# Patient Record
Sex: FEMALE | Race: WHITE | NOT HISPANIC OR LATINO | Employment: OTHER | URBAN - METROPOLITAN AREA
[De-identification: names, ages, dates, MRNs, and addresses within clinical notes are randomized per-mention and may not be internally consistent; named-entity substitution may affect disease eponyms.]

---

## 2017-08-28 ENCOUNTER — ALLSCRIPTS OFFICE VISIT (OUTPATIENT)
Dept: OTHER | Facility: OTHER | Age: 82
End: 2017-08-28

## 2017-09-28 ENCOUNTER — ALLSCRIPTS OFFICE VISIT (OUTPATIENT)
Dept: OTHER | Facility: OTHER | Age: 82
End: 2017-09-28

## 2017-09-28 ENCOUNTER — GENERIC CONVERSION - ENCOUNTER (OUTPATIENT)
Dept: OTHER | Facility: OTHER | Age: 82
End: 2017-09-28

## 2018-06-02 ENCOUNTER — APPOINTMENT (EMERGENCY)
Dept: RADIOLOGY | Facility: HOSPITAL | Age: 83
End: 2018-06-02
Payer: MEDICARE

## 2018-06-02 ENCOUNTER — APPOINTMENT (EMERGENCY)
Dept: CT IMAGING | Facility: HOSPITAL | Age: 83
End: 2018-06-02
Payer: MEDICARE

## 2018-06-02 ENCOUNTER — HOSPITAL ENCOUNTER (OUTPATIENT)
Facility: HOSPITAL | Age: 83
Setting detail: OBSERVATION
Discharge: HOME | End: 2018-06-03
Attending: EMERGENCY MEDICINE | Admitting: INTERNAL MEDICINE
Payer: MEDICARE
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
## 2018-06-02 DIAGNOSIS — R53.1 WEAKNESS: Primary | ICD-10-CM

## 2018-06-02 PROBLEM — I10 HYPERTENSION: Status: ACTIVE | Noted: 2018-06-02

## 2018-06-02 PROBLEM — F41.9 ANXIETY: Status: ACTIVE | Noted: 2018-06-02

## 2018-06-02 PROBLEM — E78.5 HYPERLIPIDEMIA: Status: ACTIVE | Noted: 2018-06-02

## 2018-06-02 LAB
ALBUMIN SERPL BCP-MCNC: 3.6 G/DL (ref 3.5–5)
ALP SERPL-CCNC: 59 U/L (ref 46–116)
ALT SERPL W P-5'-P-CCNC: 29 U/L (ref 12–78)
ANION GAP SERPL CALCULATED.3IONS-SCNC: 6 MMOL/L (ref 4–13)
APTT PPP: 25 SECONDS (ref 24–36)
AST SERPL W P-5'-P-CCNC: 17 U/L (ref 5–45)
BACTERIA UR QL AUTO: ABNORMAL /HPF
BASOPHILS # BLD AUTO: 0.04 THOUSANDS/ΜL (ref 0–0.1)
BASOPHILS NFR BLD AUTO: 1 % (ref 0–1)
BILIRUB SERPL-MCNC: 0.4 MG/DL (ref 0.2–1)
BILIRUB UR QL STRIP: NEGATIVE
BUN SERPL-MCNC: 15 MG/DL (ref 5–25)
CALCIUM SERPL-MCNC: 9 MG/DL (ref 8.3–10.1)
CHLORIDE SERPL-SCNC: 104 MMOL/L (ref 100–108)
CLARITY UR: CLEAR
CO2 SERPL-SCNC: 24 MMOL/L (ref 21–32)
COLOR UR: YELLOW
CREAT SERPL-MCNC: 0.82 MG/DL (ref 0.6–1.3)
EOSINOPHIL # BLD AUTO: 0.14 THOUSAND/ΜL (ref 0–0.61)
EOSINOPHIL NFR BLD AUTO: 2 % (ref 0–6)
ERYTHROCYTE [DISTWIDTH] IN BLOOD BY AUTOMATED COUNT: 13.1 % (ref 11.6–15.1)
EST. AVERAGE GLUCOSE BLD GHB EST-MCNC: 146 MG/DL
GFR SERPL CREATININE-BSD FRML MDRD: 62 ML/MIN/1.73SQ M
GLUCOSE SERPL-MCNC: 168 MG/DL (ref 65–140)
GLUCOSE SERPL-MCNC: 178 MG/DL (ref 65–140)
GLUCOSE SERPL-MCNC: 221 MG/DL (ref 65–140)
GLUCOSE SERPL-MCNC: 355 MG/DL (ref 65–140)
GLUCOSE UR STRIP-MCNC: NEGATIVE MG/DL
HBA1C MFR BLD: 6.7 % (ref 4.2–6.3)
HCT VFR BLD AUTO: 35.9 % (ref 34.8–46.1)
HGB BLD-MCNC: 12.1 G/DL (ref 11.5–15.4)
HGB UR QL STRIP.AUTO: NEGATIVE
IMM GRANULOCYTES # BLD AUTO: 0.05 THOUSAND/UL (ref 0–0.2)
IMM GRANULOCYTES NFR BLD AUTO: 1 % (ref 0–2)
INR PPP: 1.01 (ref 0.86–1.17)
KETONES UR STRIP-MCNC: NEGATIVE MG/DL
LEUKOCYTE ESTERASE UR QL STRIP: NEGATIVE
LYMPHOCYTES # BLD AUTO: 1.58 THOUSANDS/ΜL (ref 0.6–4.47)
LYMPHOCYTES NFR BLD AUTO: 23 % (ref 14–44)
MAGNESIUM SERPL-MCNC: 2 MG/DL (ref 1.6–2.6)
MCH RBC QN AUTO: 33.2 PG (ref 26.8–34.3)
MCHC RBC AUTO-ENTMCNC: 33.7 G/DL (ref 31.4–37.4)
MCV RBC AUTO: 99 FL (ref 82–98)
MONOCYTES # BLD AUTO: 0.47 THOUSAND/ΜL (ref 0.17–1.22)
MONOCYTES NFR BLD AUTO: 7 % (ref 4–12)
NEUTROPHILS # BLD AUTO: 4.68 THOUSANDS/ΜL (ref 1.85–7.62)
NEUTS SEG NFR BLD AUTO: 66 % (ref 43–75)
NITRITE UR QL STRIP: NEGATIVE
NON-SQ EPI CELLS URNS QL MICRO: ABNORMAL /HPF
NRBC BLD AUTO-RTO: 0 /100 WBCS
PH UR STRIP.AUTO: 5.5 [PH] (ref 4.5–8)
PHOSPHATE SERPL-MCNC: 3 MG/DL (ref 2.3–4.1)
PLATELET # BLD AUTO: 203 THOUSANDS/UL (ref 149–390)
PLATELET # BLD AUTO: 218 THOUSANDS/UL (ref 149–390)
PMV BLD AUTO: 10.4 FL (ref 8.9–12.7)
PMV BLD AUTO: 10.5 FL (ref 8.9–12.7)
POTASSIUM SERPL-SCNC: 4.6 MMOL/L (ref 3.5–5.3)
PROT SERPL-MCNC: 7.6 G/DL (ref 6.4–8.2)
PROT UR STRIP-MCNC: ABNORMAL MG/DL
PROTHROMBIN TIME: 13.2 SECONDS (ref 11.8–14.2)
RBC # BLD AUTO: 3.64 MILLION/UL (ref 3.81–5.12)
RBC #/AREA URNS AUTO: ABNORMAL /HPF
SODIUM SERPL-SCNC: 134 MMOL/L (ref 136–145)
SP GR UR STRIP.AUTO: 1.02 (ref 1–1.03)
TROPONIN I SERPL-MCNC: <0.02 NG/ML
TSH SERPL DL<=0.05 MIU/L-ACNC: 0.93 UIU/ML (ref 0.36–3.74)
UROBILINOGEN UR QL STRIP.AUTO: 0.2 E.U./DL
WBC # BLD AUTO: 6.96 THOUSAND/UL (ref 4.31–10.16)
WBC #/AREA URNS AUTO: ABNORMAL /HPF

## 2018-06-02 PROCEDURE — 83735 ASSAY OF MAGNESIUM: CPT | Performed by: EMERGENCY MEDICINE

## 2018-06-02 PROCEDURE — 81001 URINALYSIS AUTO W/SCOPE: CPT | Performed by: EMERGENCY MEDICINE

## 2018-06-02 PROCEDURE — 80053 COMPREHEN METABOLIC PANEL: CPT | Performed by: EMERGENCY MEDICINE

## 2018-06-02 PROCEDURE — 85025 COMPLETE CBC W/AUTO DIFF WBC: CPT | Performed by: EMERGENCY MEDICINE

## 2018-06-02 PROCEDURE — 96361 HYDRATE IV INFUSION ADD-ON: CPT

## 2018-06-02 PROCEDURE — 85730 THROMBOPLASTIN TIME PARTIAL: CPT | Performed by: EMERGENCY MEDICINE

## 2018-06-02 PROCEDURE — 99285 EMERGENCY DEPT VISIT HI MDM: CPT

## 2018-06-02 PROCEDURE — 84100 ASSAY OF PHOSPHORUS: CPT | Performed by: EMERGENCY MEDICINE

## 2018-06-02 PROCEDURE — 99220 PR INITIAL OBSERVATION CARE/DAY 70 MINUTES: CPT | Performed by: GENERAL PRACTICE

## 2018-06-02 PROCEDURE — 93005 ELECTROCARDIOGRAM TRACING: CPT

## 2018-06-02 PROCEDURE — 84484 ASSAY OF TROPONIN QUANT: CPT | Performed by: EMERGENCY MEDICINE

## 2018-06-02 PROCEDURE — 84443 ASSAY THYROID STIM HORMONE: CPT | Performed by: GENERAL PRACTICE

## 2018-06-02 PROCEDURE — 83036 HEMOGLOBIN GLYCOSYLATED A1C: CPT | Performed by: GENERAL PRACTICE

## 2018-06-02 PROCEDURE — 36415 COLL VENOUS BLD VENIPUNCTURE: CPT | Performed by: EMERGENCY MEDICINE

## 2018-06-02 PROCEDURE — 85049 AUTOMATED PLATELET COUNT: CPT | Performed by: GENERAL PRACTICE

## 2018-06-02 PROCEDURE — 70450 CT HEAD/BRAIN W/O DYE: CPT

## 2018-06-02 PROCEDURE — 85610 PROTHROMBIN TIME: CPT | Performed by: EMERGENCY MEDICINE

## 2018-06-02 PROCEDURE — 71046 X-RAY EXAM CHEST 2 VIEWS: CPT

## 2018-06-02 PROCEDURE — 82948 REAGENT STRIP/BLOOD GLUCOSE: CPT

## 2018-06-02 PROCEDURE — 96360 HYDRATION IV INFUSION INIT: CPT

## 2018-06-02 RX ORDER — LORAZEPAM 0.5 MG/1
0.5 TABLET ORAL 2 TIMES DAILY
Status: DISCONTINUED | OUTPATIENT
Start: 2018-06-02 | End: 2018-06-03 | Stop reason: HOSPADM

## 2018-06-02 RX ORDER — DIPHENHYDRAMINE HYDROCHLORIDE 50 MG/ML
25 INJECTION INTRAMUSCULAR; INTRAVENOUS EVERY 6 HOURS PRN
Status: DISCONTINUED | OUTPATIENT
Start: 2018-06-02 | End: 2018-06-03 | Stop reason: HOSPADM

## 2018-06-02 RX ORDER — ATORVASTATIN CALCIUM 40 MG/1
40 TABLET, FILM COATED ORAL EVERY EVENING
Status: DISCONTINUED | OUTPATIENT
Start: 2018-06-02 | End: 2018-06-03 | Stop reason: HOSPADM

## 2018-06-02 RX ORDER — LABETALOL HYDROCHLORIDE 5 MG/ML
10 INJECTION, SOLUTION INTRAVENOUS EVERY 6 HOURS PRN
Status: DISCONTINUED | OUTPATIENT
Start: 2018-06-02 | End: 2018-06-03 | Stop reason: HOSPADM

## 2018-06-02 RX ORDER — METOPROLOL TARTRATE 50 MG/1
50 TABLET, FILM COATED ORAL 2 TIMES DAILY
Status: DISCONTINUED | OUTPATIENT
Start: 2018-06-02 | End: 2018-06-03 | Stop reason: HOSPADM

## 2018-06-02 RX ORDER — HYDRALAZINE HYDROCHLORIDE 20 MG/ML
5 INJECTION INTRAMUSCULAR; INTRAVENOUS EVERY 6 HOURS PRN
Status: DISCONTINUED | OUTPATIENT
Start: 2018-06-02 | End: 2018-06-02

## 2018-06-02 RX ORDER — ASPIRIN 81 MG/1
81 TABLET, CHEWABLE ORAL DAILY
Status: DISCONTINUED | OUTPATIENT
Start: 2018-06-02 | End: 2018-06-03 | Stop reason: HOSPADM

## 2018-06-02 RX ORDER — HEPARIN SODIUM 5000 [USP'U]/ML
5000 INJECTION, SOLUTION INTRAVENOUS; SUBCUTANEOUS EVERY 8 HOURS SCHEDULED
Status: DISCONTINUED | OUTPATIENT
Start: 2018-06-02 | End: 2018-06-03 | Stop reason: HOSPADM

## 2018-06-02 RX ADMIN — DIPHENHYDRAMINE HYDROCHLORIDE 25 MG: 50 INJECTION, SOLUTION INTRAMUSCULAR; INTRAVENOUS at 18:09

## 2018-06-02 RX ADMIN — ATORVASTATIN CALCIUM 40 MG: 40 TABLET, FILM COATED ORAL at 17:39

## 2018-06-02 RX ADMIN — HEPARIN SODIUM 5000 UNITS: 5000 INJECTION, SOLUTION INTRAVENOUS; SUBCUTANEOUS at 15:04

## 2018-06-02 RX ADMIN — INSULIN LISPRO 1 UNITS: 100 INJECTION, SOLUTION INTRAVENOUS; SUBCUTANEOUS at 21:10

## 2018-06-02 RX ADMIN — INSULIN LISPRO 1 UNITS: 100 INJECTION, SOLUTION INTRAVENOUS; SUBCUTANEOUS at 15:49

## 2018-06-02 RX ADMIN — LORAZEPAM 0.5 MG: 0.5 TABLET ORAL at 17:39

## 2018-06-02 RX ADMIN — HEPARIN SODIUM 5000 UNITS: 5000 INJECTION, SOLUTION INTRAVENOUS; SUBCUTANEOUS at 21:12

## 2018-06-02 RX ADMIN — AMITRIPTYLINE HYDROCHLORIDE: 10 TABLET, FILM COATED ORAL at 17:40

## 2018-06-02 RX ADMIN — METOPROLOL TARTRATE 50 MG: 50 TABLET ORAL at 17:40

## 2018-06-02 RX ADMIN — HYDRALAZINE HYDROCHLORIDE 5 MG: 20 INJECTION INTRAMUSCULAR; INTRAVENOUS at 15:37

## 2018-06-02 RX ADMIN — ASPIRIN 81 MG 81 MG: 81 TABLET ORAL at 15:03

## 2018-06-02 RX ADMIN — LABETALOL 20 MG/4 ML (5 MG/ML) INTRAVENOUS SYRINGE 10 MG: at 23:22

## 2018-06-02 RX ADMIN — SODIUM CHLORIDE 1000 ML: 0.9 INJECTION, SOLUTION INTRAVENOUS at 10:59

## 2018-06-02 NOTE — ED PROVIDER NOTES
Pt Name: Cassandra Lopez  MRN: [id number]  [place] 5/23/1925  Age/Sex: 80 y o  female  Date of evaluation: 6/2/2018  PCP: Ludin Atkinson, [address] [place] [address]    Chief Complaint   Patient presents with    Fall     patient presents post fall this AM  patient denies hitting head/LOC  denies blood thinners         HPI    Elisa presents to the Emergency Department complaining of weakness  She was walking to the phone to answer it and fell onto the floor  She was able to use her alert bracelet to call for help  Her son reports that she was too weak to get up on her own  HPI      Past Medical and Surgical History    Past Medical History:   Diagnosis Date    Anxiety     Cancer ([facility] [address] )     breast     Hyperlipidemia     Hypertension        Past Surgical History:   Procedure Laterality Date    CARDIAC SURGERY      triple bypass       History reviewed  No pertinent family history  Social History   Substance Use Topics    Smoking status: Never Smoker    Smokeless tobacco: Not on file    Alcohol use No              Allergies    Allergies   Allergen Reactions    Sulfa Antibiotics Anaphylaxis       Home Medications    Prior to Admission medications    Medication Sig Start Date End Date Taking? Authorizing Provider   aspirin 81 MG tablet Take 81 mg by mouth daily    Historical Provider, MD   LORazepam (ATIVAN) 0 5 mg tablet Take 0 5 mg by mouth 2 (two) times a day    Historical Provider, MD   metoprolol tartrate (LOPRESSOR) 25 mg tablet Take 50 mg by mouth 2 (two) times a day      Historical Provider, MD   perphenazine-amitriptyline 2-10 MG TABS Take 1 tablet by mouth 2 (two) times a day    Historical Provider, MD   simvastatin (ZOCOR) 10 mg tablet Take 20 mg by mouth daily at bedtime      Historical Provider, MD           Review of Systems    Review of Systems   Constitutional: Negative for activity change, appetite change, chills, diaphoresis, fatigue and fever     HENT: Negative for congestion, postnasal drip, rhinorrhea, sinus pressure, sneezing and sore throat  Eyes: Negative for pain and visual disturbance  Respiratory: Negative for cough, chest tightness and shortness of breath  Cardiovascular: Negative for chest pain, palpitations and leg swelling  Gastrointestinal: Negative for abdominal distention, abdominal pain, constipation, diarrhea, nausea and vomiting  Endocrine: Negative for polydipsia, polyphagia and polyuria  Genitourinary: Negative for decreased urine volume, difficulty urinating, dysuria, flank pain, frequency and hematuria  Musculoskeletal: Negative for arthralgias, gait problem, joint swelling and neck pain  Skin: Negative for pallor and rash  Allergic/Immunologic: Negative for immunocompromised state  Neurological: Negative for syncope, speech difficulty, weakness, light-headedness, numbness and headaches  All other systems reviewed and are negative  Physical Exam      ED Triage Vitals   Temperature Pulse Respirations Blood Pressure SpO2   06/02/18 1004 06/02/18 1003 06/02/18 1003 06/02/18 1004 06/02/18 1003   97 9 °F (36 6 °C) 78 18 (!) 198/88 94 %      Temp Source Heart Rate Source Patient Position - Orthostatic VS BP Location FiO2 (%)   06/02/18 1004 06/02/18 1004 06/02/18 1004 06/02/18 1004 --   Oral Monitor Lying Right arm       Pain Score       06/02/18 1421       No Pain               Physical Exam   Constitutional: She is oriented to person, place, and time  She appears well-developed and well-nourished  No distress  HENT:   Head: Normocephalic and atraumatic  Nose: Nose normal    Mouth/Throat: Oropharynx is clear and moist    Eyes: Conjunctivae, EOM and lids are normal  Pupils are equal, round, and reactive to light  Neck: Normal range of motion  Neck supple  Cardiovascular: Normal rate, regular rhythm and normal heart sounds  Exam reveals no gallop and no friction rub  No murmur heard    Pulmonary/Chest: Effort normal and breath sounds normal  No accessory muscle usage  No respiratory distress  She has no wheezes  She has no rales  Abdominal: Soft  She exhibits no distension  There is no tenderness  There is no rebound and no guarding  Neurological: She is alert and oriented to person, place, and time  No cranial nerve deficit or sensory deficit  Skin: Skin is warm and dry  No rash noted  She is not diaphoretic  No erythema  Psychiatric: She has a normal mood and affect  Her speech is normal and behavior is normal  Judgment and thought content normal    Nursing note and vitals reviewed  Assessment and Plan    Candelaria Mcghee is a 80 y o  female who presents with fall   Physical examination unremarkable Plan will be to perform diagnostic testing and treat symptomatically  MDM    Diagnostic Results      EKG INTERPRETATION  RHYTHM:NSR  AXIS: normal  INTERVALS: normal  QRS COMPLEX: normal  ST SEGMENT: abnormal and changed  QT INTERVAL:   COMPARED WITH PRIOR - new ischemic changes    Interpretation by Charles Martin, DO  EKG reviewed and interpreted independently      Labs:    Results for orders placed or performed during the hospital encounter of 06/02/18   CBC and differential   Result Value Ref Range    WBC 6 96 4 31 - 10 16 Thousand/uL    RBC 3 64 (L) 3 81 - 5 12 Million/uL    Hemoglobin 12 1 11 5 - 15 4 g/dL    Hematocrit 35 9 34 8 - 46 1 %    MCV 99 (H) 82 - 98 fL    MCH 33 2 26 8 - 34 3 pg    MCHC 33 7 31 4 - 37 4 g/dL    RDW 13 1 11 6 - 15 1 %    MPV 10 4 8 9 - 12 7 fL    Platelets 133 133 - 792 Thousands/uL    nRBC 0 /100 WBCs    Neutrophils Relative 66 43 - 75 %    Immat GRANS % 1 0 - 2 %    Lymphocytes Relative 23 14 - 44 %    Monocytes Relative 7 4 - 12 %    Eosinophils Relative 2 0 - 6 %    Basophils Relative 1 0 - 1 %    Neutrophils Absolute 4 68 1 85 - 7 62 Thousands/µL    Immature Grans Absolute 0 05 0 00 - 0 20 Thousand/uL    Lymphocytes Absolute 1 58 0 60 - 4 47 Thousands/µL    Monocytes Absolute 0 47 0 17 - 1 22 Thousand/µL    Eosinophils Absolute 0 14 0 00 - 0 61 Thousand/µL    Basophils Absolute 0 04 0 00 - 0 10 Thousands/µL   Comprehensive metabolic panel   Result Value Ref Range    Sodium 134 (L) 136 - 145 mmol/L    Potassium 4 6 3 5 - 5 3 mmol/L    Chloride 104 100 - 108 mmol/L    CO2 24 21 - 32 mmol/L    Anion Gap 6 4 - 13 mmol/L    BUN 15 5 - 25 mg/dL    Creatinine 0 82 0 60 - 1 30 mg/dL    Glucose 168 (H) 65 - 140 mg/dL    Calcium 9 0 8 3 - 10 1 mg/dL    AST 17 5 - 45 U/L    ALT 29 12 - 78 U/L    Alkaline Phosphatase 59 46 - 116 U/L    Total Protein 7 6 6 4 - 8 2 g/dL    Albumin 3 6 3 5 - 5 0 g/dL    Total Bilirubin 0 40 0 20 - 1 00 mg/dL    eGFR 62 ml/min/1 73sq m   Troponin I   Result Value Ref Range    Troponin I <0 02 <=0 04 ng/mL   Phosphorus   Result Value Ref Range    Phosphorus 3 0 2 3 - 4 1 mg/dL   Magnesium   Result Value Ref Range    Magnesium 2 0 1 6 - 2 6 mg/dL   Protime-INR   Result Value Ref Range    Protime 13 2 11 8 - 14 2 seconds    INR 1 01 0 86 - 1 17   APTT   Result Value Ref Range    PTT 25 24 - 36 seconds   UA w Reflex to Microscopic w Reflex to Culture   Result Value Ref Range    Color, UA Yellow     Clarity, UA Clear     Specific Gravity, UA 1 020 1 003 - 1 030    pH, UA 5 5 4 5 - 8 0    Leukocytes, UA Negative Negative    Nitrite, UA Negative Negative    Protein, UA Trace (A) Negative mg/dl    Glucose, UA Negative Negative mg/dl    Ketones, UA Negative Negative mg/dl    Urobilinogen, UA 0 2 0 2, 1 0 E U /dl E U /dl    Bilirubin, UA Negative Negative    Blood, UA Negative Negative   Urine Microscopic   Result Value Ref Range    RBC, UA 0-1 (A) None Seen, 0-5 /hpf    WBC, UA 0-1 (A) None Seen, 0-5, 5-55, 5-65 /hpf    Epithelial Cells Occasional None Seen, Occasional /hpf    Bacteria, UA Occasional None Seen, Occasional /hpf   TSH, 3rd generation   Result Value Ref Range    TSH 3RD GENERATON 0 930 0 358 - 3 740 uIU/mL   Fingerstick Glucose (POCT)   Result Value Ref Range    POC Glucose 355 (H) 65 - 140 mg/dl   Fingerstick Glucose (POCT)   Result Value Ref Range    POC Glucose 178 (H) 65 - 140 mg/dl       All labs reviewed and utilized in the medical decision making process    Radiology:    XR chest 2 views   Final Result      No active pulmonary disease  Workstation performed: [id number]         CT head without contrast   Final Result      No acute intracranial abnormality  Microangiopathic changes                    Workstation performed: [id number]         MRI Inpatient Order    (Results Pending)       All radiology studies independently viewed by me and interpreted by the radiologist     Procedure    Procedures    CritCare Time      ED Course of Care and Re-Assessments      Medications   LORazepam (ATIVAN) tablet 0 5 mg (not administered)   metoprolol tartrate (LOPRESSOR) tablet 50 mg (not administered)   perphenazine-amitriptyline (DUO-DEBBIE 2/10) combo dose (not administered)   atorvastatin (LIPITOR) tablet 40 mg (not administered)   aspirin chewable tablet 81 mg (81 mg Oral Given 6/2/18 1503)   heparin (porcine) subcutaneous injection 5,000 Units (5,000 Units Subcutaneous Given 6/2/18 1504)   insulin lispro (HumaLOG) 100 units/mL subcutaneous injection 1-5 Units (1 Units Subcutaneous Given 6/2/18 1549)   insulin lispro (HumaLOG) 100 units/mL subcutaneous injection 1-5 Units (not administered)   hydrALAZINE (APRESOLINE) injection 5 mg (5 mg Intravenous Given 6/2/18 1537)   pneumococcal 13-valent conjugate vaccine (PREVNAR-13) IM injection 0 5 mL (not administered)   sodium chloride 0 9 % bolus 1,000 mL (0 mL Intravenous Stopped 6/2/18 1437)           FINAL IMPRESSION    Final diagnoses:   Weakness         DISPOSITION/PLAN    Time reflects when diagnosis was documented in both MDM as applicable and the Disposition within this note     Time User Action Codes Description Comment    6/2/2018 12:37 PM Sonia Carmichael [R53 1] Weakness     6/2/2018 12:59 PM Rodger Abraham Add [W19  IUUG] Fall, initial encounter     6/2/2018 12:59 PM Ana LuisayaredClarissaCyndi Su Modify [G86  XXXA] Fall, initial encounter       ED Disposition     ED Disposition Condition Comment    Admit  Case was discussed with LINDSAY and the patient's admission status was agreed to be Admission Status: observation status to the service of Dr Kitty Montana   Follow-up Information    None           PATIENT REFERRED TO:    No follow-up provider specified  DISCHARGE MEDICATIONS:    Current Discharge Medication List      CONTINUE these medications which have NOT CHANGED    Details   aspirin 81 MG tablet Take 81 mg by mouth daily      LORazepam (ATIVAN) 0 5 mg tablet Take 0 5 mg by mouth 2 (two) times a day      metoprolol tartrate (LOPRESSOR) 25 mg tablet Take 50 mg by mouth 2 (two) times a day        perphenazine-amitriptyline 2-10 MG TABS Take 1 tablet by mouth 2 (two) times a day      simvastatin (ZOCOR) 10 mg tablet Take 20 mg by mouth daily at bedtime               No discharge procedures on file           Rashawn Mauricio, [address] [place] [address],   06/02/18 3482

## 2018-06-02 NOTE — H&P
History and Physical - [facility] Internal Medicine    Patient Information: Nellie Dudley 80 y o  female MRN: [id number]  Unit/Bed#: ED 07 Encounter: [id number]  Admitting Physician: Radha Nicole MD  PCP: Oralia Ponce DO  Date of Admission:  06/02/18    Assessment/Plan:    Hospital Problem List:     Active Problems:    Fall    Hyperlipidemia    Anxiety    Hypertension      Plan for the Primary Problem(s): Active Problems:    Fall-pt denies LOC-gait unsteady and possible left leg weakness after fall-stroke protocol ordered; neurology consult; check tsh; asa; echo and carotid us; ua neg; PT/OT consults    Hyperlipidemia-check flp; lipitor for now    Anxiety-continue home meds    Hypertension-on lopressor  Elevated random blood glucose-check an a1c and accucks    VTE Prophylaxis: Heparin  / sequential compression device   Code Status:   POLST: POLST is not applicable to this patient    Anticipated Length of Stay:  Patient will be admitted on an Observation basis with an anticipated length of stay of  < 2 midnights  Justification for Hospital Stay: fall    Total Time for Visit, including Counseling / Coordination of Care: 30 minutes  Greater than 50% of this total time spent on direct patient counseling and coordination of care  Chief Complaint:   fall    History of Present Illness:    Nellie Dudley is a 80 y o  female who presents with fall  Patient was walking to get a phone call and "lost her balance"  She fell to the right side and then down to the floor  She denies LOC or cp or sob  She felt well until this occurred  She called life alert and son came to her house  She was sitting on floor he helped her up but sje seemed to be off balance and some left leg weakness  Review of Systems:    Review of Systems   Constitutional: Negative  HENT: Negative  Eyes: Positive for redness  Respiratory: Negative for chest tightness, shortness of breath and wheezing      Cardiovascular: Negative for chest pain, palpitations and leg swelling  Gastrointestinal: Negative for abdominal pain  Genitourinary: Negative  Musculoskeletal: Negative  Neurological: Negative for dizziness, seizures, light-headedness and headaches  Past Medical and Surgical History:     Past Medical History:   Diagnosis Date    Anxiety     Cancer ([facility] [address] )     breast     Hyperlipidemia     Hypertension        Past Surgical History:   Procedure Laterality Date    CARDIAC SURGERY      triple bypass       Meds/Allergies:    Prior to Admission medications    Medication Sig Start Date End Date Taking? Authorizing Provider   aspirin 81 MG tablet Take 81 mg by mouth daily   Yes Historical Provider, MD   LORazepam (ATIVAN) 0 5 mg tablet Take 0 5 mg by mouth 2 (two) times a day   Yes Historical Provider, MD   metoprolol tartrate (LOPRESSOR) 25 mg tablet Take 50 mg by mouth 2 (two) times a day     Yes Historical Provider, MD   perphenazine-amitriptyline 2-10 MG TABS Take 1 tablet by mouth 2 (two) times a day   Yes Historical Provider, MD   simvastatin (ZOCOR) 10 mg tablet Take 20 mg by mouth daily at bedtime     Yes Historical Provider, MD     I have reviewed home medications with patient personally  Allergies: Allergies   Allergen Reactions    Sulfa Antibiotics Anaphylaxis       Social History:     Marital Status:     Occupation:   Patient Pre-hospital Living Situation:   Patient Pre-hospital Level of Mobility:   Patient Pre-hospital Diet Restrictions:   Substance Use History:   History   Alcohol Use No     History   Smoking Status    Never Smoker   Smokeless Tobacco    Not on file     History   Drug Use No       Family History:    non-contributory    Physical Exam:     Vitals:   Blood Pressure: (!) 188/80 (06/02/18 1245)  Pulse: 80 (06/02/18 1245)  Temperature: 97 9 °F (36 6 °C) (06/02/18 1004)  Temp Source: Oral (06/02/18 1004)  Respirations: 18 (06/02/18 1245)  Weight - Scale: 78 7 kg (173 lb 8 oz) (06/02/18 1004)  SpO2: 95 % (06/02/18 1245)    Physical Exam   Constitutional: She is oriented to person, place, and time  She appears well-developed and well-nourished  HENT:   Head: Normocephalic and atraumatic  Eyes: Pupils are equal, round, and reactive to light  Cardiovascular: Normal rate and regular rhythm  Pulmonary/Chest: Effort normal and breath sounds normal    Abdominal: Soft  Bowel sounds are normal    Musculoskeletal: She exhibits no edema  Neurological: She is alert and oriented to person, place, and time  No cranial nerve deficit  Coordination normal    Strength lower ext 5/5 symmetrical       Additional Data:     Lab Results: I have personally reviewed pertinent reports  Results from last 7 days  Lab Units 06/02/18  1033   WBC Thousand/uL 6 96   HEMOGLOBIN g/dL 12 1   HEMATOCRIT % 35 9   PLATELETS Thousands/uL 203   NEUTROS PCT % 66   LYMPHS PCT % 23   MONOS PCT % 7   EOS PCT % 2       Results from last 7 days  Lab Units 06/02/18  1033   SODIUM mmol/L 134*   POTASSIUM mmol/L 4 6   CHLORIDE mmol/L 104   CO2 mmol/L 24   BUN mg/dL 15   CREATININE mg/dL 0 82   CALCIUM mg/dL 9 0   TOTAL PROTEIN g/dL 7 6   BILIRUBIN TOTAL mg/dL 0 40   ALK PHOS U/L 59   ALT U/L 29   AST U/L 17   GLUCOSE RANDOM mg/dL 168*       Results from last 7 days  Lab Units 06/02/18  1033   INR  1 01       Imaging: I have personally reviewed pertinent reports  Ct Head Without Contrast    Result Date: 6/2/2018  Narrative: CT BRAIN - WITHOUT CONTRAST INDICATION:   fall  patient presents post fall this AM  patient denies hitting head/LOC  denies blood thinners) COMPARISON:  12/26/2016 TECHNIQUE:  CT examination of the brain was performed  In addition to axial images, coronal 2D reformatted images were created and submitted for interpretation  Radiation dose length product (DLP) for this visit:  990 mGy-cm     This examination, like all CT scans performed in the [facility], was performed utilizing techniques to minimize radiation dose exposure, including the use of iterative reconstruction and automated exposure control  IMAGE QUALITY:  Diagnostic  FINDINGS: PARENCHYMA: Decreased attenuation is noted in periventricular and subcortical white matter demonstrating an appearance that is statistically most likely to represent mild microangiopathic change  Atherosclerotic calcifications noted  No CT signs of acute infarction  No intracranial mass, mass effect or midline shift  No acute parenchymal hemorrhage  VENTRICLES AND EXTRA-AXIAL SPACES:  Normal for the patient's age  VISUALIZED ORBITS AND PARANASAL SINUSES:  Bilateral lens implants noted CALVARIUM AND EXTRACRANIAL SOFT TISSUES:  Normal      Impression: No acute intracranial abnormality  Microangiopathic changes  Workstation performed: [id number]       EKG, Pathology, and Other Studies Reviewed on Admission:   · EKG:     Allscripts Records Reviewed: Yes     ** Please Note: Dragon 360 Dictation voice to text software may have been used in the creation of this document   **

## 2018-06-03 ENCOUNTER — APPOINTMENT (OUTPATIENT)
Dept: NON INVASIVE DIAGNOSTICS | Facility: HOSPITAL | Age: 83
End: 2018-06-03
Payer: MEDICARE

## 2018-06-03 ENCOUNTER — APPOINTMENT (OUTPATIENT)
Dept: MRI IMAGING | Facility: HOSPITAL | Age: 83
End: 2018-06-03
Payer: MEDICARE

## 2018-06-03 VITALS
WEIGHT: 173.5 LBS | HEART RATE: 68 BPM | HEIGHT: 61 IN | SYSTOLIC BLOOD PRESSURE: 149 MMHG | DIASTOLIC BLOOD PRESSURE: 72 MMHG | BODY MASS INDEX: 32.76 KG/M2 | RESPIRATION RATE: 17 BRPM | OXYGEN SATURATION: 97 % | TEMPERATURE: 97.4 F

## 2018-06-03 PROBLEM — R73.09 ELEVATED RANDOM BLOOD GLUCOSE LEVEL: Status: ACTIVE | Noted: 2018-06-03

## 2018-06-03 LAB
ANION GAP SERPL CALCULATED.3IONS-SCNC: 11 MMOL/L (ref 4–13)
BASOPHILS # BLD AUTO: 0.04 THOUSANDS/ΜL (ref 0–0.1)
BASOPHILS NFR BLD AUTO: 1 % (ref 0–1)
BILIRUB UR QL STRIP: NEGATIVE
BUN SERPL-MCNC: 12 MG/DL (ref 5–25)
CALCIUM SERPL-MCNC: 8.8 MG/DL (ref 8.3–10.1)
CHLORIDE SERPL-SCNC: 105 MMOL/L (ref 100–108)
CHOLEST SERPL-MCNC: 145 MG/DL (ref 50–200)
CLARITY UR: CLEAR
CO2 SERPL-SCNC: 24 MMOL/L (ref 21–32)
COLOR UR: YELLOW
CREAT SERPL-MCNC: 0.83 MG/DL (ref 0.6–1.3)
EOSINOPHIL # BLD AUTO: 0.1 THOUSAND/ΜL (ref 0–0.61)
EOSINOPHIL NFR BLD AUTO: 1 % (ref 0–6)
ERYTHROCYTE [DISTWIDTH] IN BLOOD BY AUTOMATED COUNT: 13.1 % (ref 11.6–15.1)
GFR SERPL CREATININE-BSD FRML MDRD: 61 ML/MIN/1.73SQ M
GLUCOSE SERPL-MCNC: 126 MG/DL (ref 65–140)
GLUCOSE SERPL-MCNC: 143 MG/DL (ref 65–140)
GLUCOSE SERPL-MCNC: 162 MG/DL (ref 65–140)
GLUCOSE UR STRIP-MCNC: NEGATIVE MG/DL
HCT VFR BLD AUTO: 35.9 % (ref 34.8–46.1)
HDLC SERPL-MCNC: 41 MG/DL (ref 40–60)
HGB BLD-MCNC: 11.7 G/DL (ref 11.5–15.4)
HGB UR QL STRIP.AUTO: NEGATIVE
IMM GRANULOCYTES # BLD AUTO: 0.04 THOUSAND/UL (ref 0–0.2)
IMM GRANULOCYTES NFR BLD AUTO: 1 % (ref 0–2)
KETONES UR STRIP-MCNC: NEGATIVE MG/DL
LDLC SERPL CALC-MCNC: 55 MG/DL (ref 0–100)
LEUKOCYTE ESTERASE UR QL STRIP: NEGATIVE
LYMPHOCYTES # BLD AUTO: 2.33 THOUSANDS/ΜL (ref 0.6–4.47)
LYMPHOCYTES NFR BLD AUTO: 31 % (ref 14–44)
MCH RBC QN AUTO: 32.2 PG (ref 26.8–34.3)
MCHC RBC AUTO-ENTMCNC: 32.6 G/DL (ref 31.4–37.4)
MCV RBC AUTO: 99 FL (ref 82–98)
MONOCYTES # BLD AUTO: 0.68 THOUSAND/ΜL (ref 0.17–1.22)
MONOCYTES NFR BLD AUTO: 9 % (ref 4–12)
NEUTROPHILS # BLD AUTO: 4.34 THOUSANDS/ΜL (ref 1.85–7.62)
NEUTS SEG NFR BLD AUTO: 57 % (ref 43–75)
NITRITE UR QL STRIP: NEGATIVE
NRBC BLD AUTO-RTO: 0 /100 WBCS
PH UR STRIP.AUTO: 5.5 [PH] (ref 4.5–8)
PLATELET # BLD AUTO: 208 THOUSANDS/UL (ref 149–390)
PMV BLD AUTO: 10.8 FL (ref 8.9–12.7)
POTASSIUM SERPL-SCNC: 3.9 MMOL/L (ref 3.5–5.3)
PROT UR STRIP-MCNC: NEGATIVE MG/DL
RBC # BLD AUTO: 3.63 MILLION/UL (ref 3.81–5.12)
SODIUM SERPL-SCNC: 140 MMOL/L (ref 136–145)
SP GR UR STRIP.AUTO: <=1.005 (ref 1–1.03)
TRIGL SERPL-MCNC: 243 MG/DL
UROBILINOGEN UR QL STRIP.AUTO: 0.2 E.U./DL
WBC # BLD AUTO: 7.53 THOUSAND/UL (ref 4.31–10.16)

## 2018-06-03 PROCEDURE — 97163 PT EVAL HIGH COMPLEX 45 MIN: CPT

## 2018-06-03 PROCEDURE — 99222 1ST HOSP IP/OBS MODERATE 55: CPT | Performed by: PSYCHIATRY & NEUROLOGY

## 2018-06-03 PROCEDURE — 85025 COMPLETE CBC W/AUTO DIFF WBC: CPT | Performed by: GENERAL PRACTICE

## 2018-06-03 PROCEDURE — 99217 PR OBSERVATION CARE DISCHARGE MANAGEMENT: CPT | Performed by: NURSE PRACTITIONER

## 2018-06-03 PROCEDURE — 80048 BASIC METABOLIC PNL TOTAL CA: CPT | Performed by: GENERAL PRACTICE

## 2018-06-03 PROCEDURE — 82948 REAGENT STRIP/BLOOD GLUCOSE: CPT

## 2018-06-03 PROCEDURE — 81003 URINALYSIS AUTO W/O SCOPE: CPT | Performed by: GENERAL PRACTICE

## 2018-06-03 PROCEDURE — G8979 MOBILITY GOAL STATUS: HCPCS

## 2018-06-03 PROCEDURE — 70551 MRI BRAIN STEM W/O DYE: CPT

## 2018-06-03 PROCEDURE — 80061 LIPID PANEL: CPT | Performed by: GENERAL PRACTICE

## 2018-06-03 PROCEDURE — 93306 TTE W/DOPPLER COMPLETE: CPT

## 2018-06-03 PROCEDURE — G8978 MOBILITY CURRENT STATUS: HCPCS

## 2018-06-03 RX ADMIN — ASPIRIN 81 MG 81 MG: 81 TABLET ORAL at 08:53

## 2018-06-03 RX ADMIN — AMITRIPTYLINE HYDROCHLORIDE: 10 TABLET, FILM COATED ORAL at 08:54

## 2018-06-03 RX ADMIN — LABETALOL 20 MG/4 ML (5 MG/ML) INTRAVENOUS SYRINGE 10 MG: at 05:53

## 2018-06-03 RX ADMIN — LORAZEPAM 0.5 MG: 0.5 TABLET ORAL at 08:53

## 2018-06-03 RX ADMIN — HEPARIN SODIUM 5000 UNITS: 5000 INJECTION, SOLUTION INTRAVENOUS; SUBCUTANEOUS at 05:45

## 2018-06-03 RX ADMIN — METOPROLOL TARTRATE 50 MG: 50 TABLET ORAL at 08:53

## 2018-06-03 RX ADMIN — INSULIN LISPRO 1 UNITS: 100 INJECTION, SOLUTION INTRAVENOUS; SUBCUTANEOUS at 08:54

## 2018-06-03 RX ADMIN — HEPARIN SODIUM 5000 UNITS: 5000 INJECTION, SOLUTION INTRAVENOUS; SUBCUTANEOUS at 13:38

## 2018-06-03 NOTE — DISCHARGE SUMMARY
Discharge- Alanis [place] 5/23/1925, 80 y o  female MRN: [id number]    Unit/Bed#: -01 Encounter: [id number]    Primary Care Provider: Devaughn Alejandro DO   Date and time admitted to hospital: 6/2/2018 10:03 AM        * Fall   Assessment & Plan    · Appears mechanical however given concern for left-sided weakness stroke pathway initiated  · Neurology consult pending recommendations appreciated  · MRI discussed with Neurology no evidence to suggest acute infarct masslike effect or hemorrhage  Patient is stable from a neurology perspective for discharge  · Continue statin and aspirin at home dose upon discharge  · PT evaluated patient recommending short-term rehab versus home with support pending progress appreciate ongoing recommendations    · Echocardiogram         Hypertension   Assessment & Plan    · Patient with urgency on admission asymptomatic  · Okay with slightly higher blood pressures await Neurology input        Hyperlipidemia   Assessment & Plan    · Continue statin  · Lipid panel done total cholesterol is 145, triglycerides 243, HDL 41, LDL 55        Elevated random blood glucose level   Assessment & Plan    · Present on admission  · A1c is 6 7  · Continue a c  HS Accu-Cheks along with sliding scale insulin  · Recommend dietary changes upon discharge and follow up with her PCP to consider oral agent        Anxiety   Assessment & Plan    · Known history  · Continue come comfort measures and Ativan p r n  monitor for sedation              Discharging Physician / Practitioner: Qi Patel, [address]  PCP: Devaughn Alejandro DO  Admission Date:   Admission Orders     Ordered        06/02/18 1238  Place in Observation (expected length of stay for this patient is less than two midnights)  Once             Discharge Date: 06/03/18    Resolved Problems  Date Reviewed: 6/3/2018    None          Consultations During Hospital Stay:  · Neurology    Procedures Performed:     · Chest x-ray no acute active disease  · CT head no acute intracranial abnormality microangiopathic changes  · MRI brain no acute infarction or intracranial hemorrhage  Small left frontal meningioma stable since 2016  Minimal chronic microangiopathy  · Hemoglobin A1c 6 7    Significant Findings / Test Results:     · Fall, mechanical stroke ruled out  · Elevated blood glucose hemoglobin A1c 6 7 education given    Incidental Findings:   · None     Test Results Pending at Discharge (will require follow up): · None     Outpatient Tests Requested:  · Per PCP    Complications:  None    Reason for Admission:  Weakness mechanical fall    Hospital Course:     Darling Olivas is a 80 y o  female patient who originally presented to the hospital on 6/2/2018 due to sustaining a mechanical fall at home with loss of balance secondary to weakness  Patient is a pleasant 40-year-old female who has been in good state of health who had E loss of balance denies dizziness loss of consciousness head injury  Patient was admitted stroke pathway given complaints of left-sided weakness patient had an MRI of the brain that was negative seen by Neurology and cleared with no evidence of an acute infarct  Patient had an echocardiogram as mentioned above  Incidental finding of hyperglycemia A1c revealed the patient's he quit A1c was 6 7 lifestyle modifications recommended outpatient follow up with her PCP recommended for further outpatient monitoring  Please see above list of diagnoses and related plan for additional information  Condition at Discharge: stable     Discharge Day Visit / Exam:     Subjective:  Patient denies headache dizziness chest pain weakness shortness of breath states she overall feels well  Patient states she did become scared after they gave her hydralazine as she became very flush and short of breath patient stated her symptoms did resolve with Benadryl and she has had no further symptoms since then    Patient's daughter-in-law at bedside long discussion regarding patient's plan and need for follow-up upon discharge  Vitals: Blood Pressure: 149/72 (06/03/18 1100)  Pulse: 68 (06/03/18 1100)  Temperature: (!) 97 4 °F (36 3 °C) (06/03/18 1100)  Temp Source: Oral (06/03/18 1100)  Respirations: 17 (06/03/18 1100)  Height: 5' 1" (154 9 cm) (06/02/18 1400)  Weight - Scale: 78 7 kg (173 lb 8 oz) (06/02/18 1400)  SpO2: 97 % (06/03/18 1100)  Exam:   Physical Exam   Constitutional: She is oriented to person, place, and time  HENT:   Head: Normocephalic and atraumatic  Eyes: Conjunctivae and EOM are normal    Erythema left eye chronic dry eye per patient and daughter-in-law  Neck: Normal range of motion  Cardiovascular: Normal rate and regular rhythm  Murmur heard  Pulmonary/Chest: Effort normal and breath sounds normal    Abdominal: Soft  Bowel sounds are normal    Musculoskeletal: Normal range of motion  Neurological: She is alert and oriented to person, place, and time  Skin: Skin is warm and dry  Psychiatric: She has a normal mood and affect  Her behavior is normal        Discussion with Family:  Daughter-in-law and daughter    Discharge instructions/Information to patient and family:   See after visit summary for information provided to patient and family  Provisions for Follow-Up Care:  See after visit summary for information related to follow-up care and any pertinent home health orders  Disposition:     Home with [facility] Services (Reminder: Complete face to face encounter)    For Discharges to [facility]F:   · Not Applicable to this Patient - Not Applicable to this Patient    Planned Readmission:  None     Discharge Statement:  I spent 36 minutes discharging the patient  This time was spent on the day of discharge  I had direct contact with the patient on the day of discharge   Greater than 50% of the total time was spent examining patient, answering all patient questions, arranging and discussing plan of care with patient as well as directly providing post-discharge instructions  Additional time then spent on discharge activities  Discharge Medications:  See after visit summary for reconciled discharge medications provided to patient and family        ** Please Note: This note has been constructed using a voice recognition system **

## 2018-06-03 NOTE — SOCIAL WORK
Cm met with pt at bedside  OBS status reviewed and signed  Copy to pt and copy to MR for scanning  Pt lives alone in a one story hosue with 2 RYAN-no railing  Pt is able to navigate steps and is independent with ADL's  She was in cardiac rehab at [facility] following heart surgery 10 years ago  She has used [facility] services following surgery for breast cancer, but does not remember the name of the agency  Dr Armand Vizcaino is her PCP  Denies substance abuse or tobacco abuse  She does have anxiety and depression that is treated with medication by her PCP  She does not have a POA or Advanced Directive and does not want info at this time  She is retired, but still drives  A friend or family member will transport home when medically cleared  CM discussed d/c needs and none were identified at this time  Pt believes the fall that preceded  this admission was caused by dehydration  CM will continue to follow through hospitalization  CM reviewed discharge planning process including the following: identifying help at home, patient preference for discharge planning needs, pharmacy preference, and availability of treatment team to discuss questions or concerns patient and/or family may have regarding understanding medications and recognizing signs and symptoms once discharged  CM also encouraged patient to follow up with all recommended appointments after discharge  Patient advised of importance for patient and family to participate in managing patients medical well being  CM name and role reviewed and Discharge Checklist provided  Encouraged patient and caregiver to review prior to discharge

## 2018-06-03 NOTE — DISCHARGE INSTRUCTIONS
Hyperglycemia, Non-Diabetic   WHAT YOU NEED TO KNOW:   What is hyperglycemia? Hyperglycemia is a blood glucose (sugar) level that is higher than normal  Hyperglycemia can be short-term, or it can become a long-term condition that leads to diabetes  What increases my risk for hyperglycemia? · Medical problems, such as a stroke or heart attack, or pancreatitis (inflammation of your pancreas)     · Trauma, such as a burn or injury    · Infections, such as pneumonia or a urinary tract infection     · Medicines, such as steroids and diuretics, or illegal drugs, such as cocaine and ecstasy     · Surgery     · Polycystic ovary syndrome (PCOS) or a history of gestational diabetes     · Family history of diabetes     · Obesity or a sedentary lifestyle  What are the signs and symptoms of hyperglycemia? · More thirst than usual     · Frequent urination     · Weight loss without trying     · Blurred vision     · Nausea and vomiting     · Abdominal pain  How is hyperglycemia diagnosed? · A random blood glucose test  may be done at any time of day to test the amount of sugar in your blood  · A fasting plasma glucose  tests the amount of sugar in your blood after you have fasted for 8 hours  · An oral glucose tolerance test  checks how much your blood sugar level increases over a few hours  After you have fasted for 8 hours, you are given a glucose drink  Your blood sugar level is checked after 1 hour and again 2 hours after you drink the glucose  Healthcare providers look at how much your blood sugar level increases from the first check  · An A1c test  shows the average amount of sugar in your blood over the past 2 to 3 months  How is hyperglycemia treated? Treatment depends on your blood sugar level  You may need any of the following:  · Hypoglycemic medicine  helps to decrease the amount of sugar in your blood  This medicine helps your body move the sugar to your cells, where it is needed for energy  Your healthcare provider will tell you how often to take this medicine and how long to take it  · Insulin  helps to decrease the amount of sugar in your blood  You may need 1 or more shots of insulin each day  You or a family member will be taught how to give the insulin shots  Your healthcare provider will tell you how often you need to inject insulin each day  He will also tell you how long you will need to take it  What are the risks of hyperglycemia? · Treatment may cause your blood sugar level to become too low  The levels of your electrolytes (minerals) may become too high or too low  For example, your potassium level may decrease  · Without treatment, high blood sugar levels can lead to severe dehydration  If you have surgery, you may develop an infection in your surgery wound, or it may not heal well  You may get a blood clot in your leg or arm  The clot may travel to your heart or brain and cause life-threatening problems, such as a heart attack or stroke  Hyperglycemia may cause pancreatitis  Hyperglycemia can also lead to diabetes  Hyperglycemia can damage your nerves, veins, arteries, and organs over time  Damage to arteries may increase your risk for a heart attack or stroke  How can I manage my hyperglycemia? Ask your healthcare provider about these and other ways to help lower your blood sugar level or keep it steady:  · Exercise regularly  This can help to lower your blood sugar levels  It can also improve your heart health and help you stay at a healthy weight  Get at least 30 minutes of exercise 5 days each week  Ask your healthcare provider about the best exercise plan for you  · Lose weight if you are overweight  Even a small loss of 5% to 10% of your body weight can help to decrease your blood sugar levels  Weight loss can also improve your heart health  · Eat healthy foods  Include foods that are high in fiber, such as vegetables, fruits, whole grains, and beans   Also include foods that are low in fat, such as low-fat dairy (milk, yogurt, and cheese), fish, and lean meat  Limit foods that are high in calories and sugar, such as sweet desserts, potato chips, and candy  Limit foods that are high in sodium, such as table salt and salty foods  Your healthcare provider may suggest that you limit carbohydrates to lower your blood sugar levels  · Do not smoke  If you smoke, it is never too late to quit  Smoking can worsen the problems that can occur with hyperglycemia  Ask your healthcare provider for information if you need help quitting  · Limit alcohol  Women should limit alcohol to 1 drink a day  Men should limit alcohol to 2 drinks a day  A drink of alcohol is 12 ounces of beer, 5 ounces of wine, or 1½ ounces of liquor  Do I need to check my blood sugar level? You may need to check your blood sugar level with a blood glucose meter  If you take insulin, you may need to check your blood sugar level at least 3 times each day  Ask your healthcare provider when and how often to check during the day  Ask what your blood sugar levels should be before and after you eat  You may need to check for ketones in your urine if your blood sugar level is high  Write down your results and show them to your healthcare provider  When should I contact my healthcare provider? · You have a fever  · Your blood sugar levels continue to be higher than you were told they should be  · You continue to urinate more often than usual      · You continue to be more thirsty than usual      · You continue to have nausea and vomiting  · You have a wound that has signs of infection, such as redness, swelling, and pus  · You have questions or concerns about your condition or care  When should I seek immediate care or call 911? · Your arm or leg feels warm, tender, and painful  It may look swollen and red  · You feel lightheaded, short of breath, and have chest pain       · You cough up blood  CARE AGREEMENT:   You have the right to help plan your care  Learn about your health condition and how it may be treated  Discuss treatment options with your caregivers to decide what care you want to receive  You always have the right to refuse treatment  The above information is an  only  It is not intended as medical advice for individual conditions or treatments  Talk to your doctor, nurse or pharmacist before following any medical regimen to see if it is safe and effective for you  © 2017 [employer]  Information is for End User's use only and may not be sold, redistributed or otherwise used for commercial purposes  All illustrations and images included in CareNotes® are the copyrighted property of [employer]  or [employer]varez

## 2018-06-03 NOTE — PLAN OF CARE
Problem: DISCHARGE PLANNING - CARE MANAGEMENT  Goal: Discharge to post-acute care or home with appropriate resources  INTERVENTIONS:  - Conduct assessment to determine patient/family and health care team treatment goals, and need for post-acute services based on payer coverage, community resources, and patient preferences, and barriers to discharge  - Address psychosocial, clinical, and financial barriers to discharge as identified in assessment in conjunction with the patient/family and health care team  - Arrange appropriate level of post-acute services according to patients   needs and preference and payer coverage in collaboration with the physician and health care team  - Communicate with and update the patient/family, physician, and health care team regarding progress on the discharge plan  - Arrange appropriate transportation to post-acute venues  Outcome: Progressing  Cm met with pt at bedside  OBS status reviewed and signed  Copy to pt and copy to MR for scanning  Pt lives alone in a one story hosue with 2 RYAN-no railing  Pt is able to navigate steps and is independent with ADL's  She was in cardiac rehab at [facility] following heart surgery 10 years ago  She has used [facility] services following surgery for breast cancer, but does not remember the name of the agency  Dr Gabino Guerrier is her PCP  Denies substance abuse or tobacco abuse  She does have anxiety and depression that is treated with medication by her PCP  She does not have a POA or Advanced Directive and does not want info at this time  She is retired, but still drives  A friend or family member will transport home when medically cleared  CM discussed d/c needs and none were identified at this time  Pt believes the fall that preceded  this admission was caused by dehydration  CM will continue to follow through hospitalization    CM reviewed discharge planning process including the following: identifying help at home, patient preference for discharge planning needs, pharmacy preference, and availability of treatment team to discuss questions or concerns patient and/or family may have regarding understanding medications and recognizing signs and symptoms once discharged  CM also encouraged patient to follow up with all recommended appointments after discharge  Patient advised of importance for patient and family to participate in managing patients medical well being  CM name and role reviewed and Discharge Checklist provided  Encouraged patient and caregiver to review prior to discharge

## 2018-06-03 NOTE — CONSULTS
Progress Note - Neurology   Gabrielle Mitchell 80 y o  female MRN: [id number]  Unit/Bed#: -01 Encounter: [id number]      Subjective:   25-year-old female who presented to the hospital with a fall, patient was walking to get to a phone call and lost her balance, she fell on the right side and then on to the floor, she denies any loss of consciousness or chest pain or shortness of breath, she fell well until this occurred, she called [employer] and her son came to her house, there was questionable of some left leg weakness, which patient denies, she denies any headaches, no vision or speech difficulties no motor or sensory symptoms in upper or lower ext, according to the patient she has been able to ambulate  No other complaints, CT scan of the brain was unremarkable for any acute bleed, MRI scan of the brain was reviewed by myself and does not show evidence of any acute stroke, it shows a tiny left frontal meningioma  No seizure, no other complaints    ROS:  Pertinent review of systems as per HPI, otherwise negative     Vitals:   Vitals:    06/03/18 1100   BP: 149/72   Pulse: 68   Resp: 17   Temp: (!) 97 4 °F (36 3 °C)   SpO2: 97%   ,Body mass index is 32 78 kg/m²      MEDS:    Current Facility-Administered Medications:  aspirin 81 mg Oral Daily Gerhardt Smaller Remaley-Walters, MD   atorvastatin 40 mg Oral QPM Gerhardt Smaller Remaley-Walters, MD   diphenhydrAMINE 25 mg Intravenous Q6H PRN Gerhardt Smaller Remaley-Walters, MD   heparin (porcine) 5,000 Units Subcutaneous Q8H [address] BRITTANI Abraham MD   insulin lispro 1-5 Units Subcutaneous TID AC Summer BRITTANI Abraham MD   insulin lispro 1-5 Units Subcutaneous HS Summer BRITTANI Abraham MD   labetalol 10 mg Intravenous Q6H PRN Gerhardt Smaller Remaley-Walters, MD   LORazepam 0 5 mg Oral BID Gerhardt Smaller Remaley-Walters, MD   metoprolol tartrate 50 mg Oral BID César Queen MD   perphenazine-amitriptyline (DUO-DEBBIE 2/10) combo dose  Oral BID César Queen MD   [START ON 6/4/2018] pneumococcal 13-valent conjugate vaccine 0 5 mL Intramuscular Prior to discharge Macr Guerrero MD   :    Physical Exam:  Patient is alert awake oriented, high functions are intact, speech is fluent  No evidence of any aphasia or dysarthria  Cranial nerve examination reveals visual fields are full to threat, pupils equal and reactive, extraocular movements intact,  sensation in the V1 V2 V3 distribution is symmetric, no obvious facial asymmetry noted, tongue is midline and gag is adequate  Motor examination reveals normal tone and bulk, no evidence of any drift to the outstretched extremities, strength is 5/5 preserved bilaterally in both upper and lower extremities, deep tendon reflexes are intact, toes are downgoing  Sensory examination to pinprick light touch proprioception and vibration is preserved bilaterally, patient does not extinguish double simultaneous stimuli  Coordination no evidence of any finger-to-nose dysmetria, no evidence of any dysdiadochokinesia,  Gait not tested, according to the patient she is able to ambulate without any issues          Lab Results: I have personally reviewed pertinent reports  Imaging Studies: I have personally reviewed pertinent films in PACS      Assessment/plan,  1  Status post fall with no evidence of any acute stroke on the MRI scan    Check orthostatic blood pressure, physical therapy evaluation, she can follow up with Neurosurgery as an outpatient for her meningioma does not look patient would need surgery, other management as per primary team, case discussed with primary team         Tamra Dotson MD  6/3/2018,12:19 PM

## 2018-06-03 NOTE — CASE MANAGEMENT
Initial Clinical Review    Admission: Date/Time/Statement: OBS    6/2   1238    Orders Placed This Encounter   Procedures    Place in Observation (expected length of stay for this patient is less than two midnights)     Standing Status:   Standing     Number of Occurrences:   1     Order Specific Question:   Admitting Physician     Answer:   Osbaldo Kiran [[id number]]     Order Specific Question:   Level of Care     Answer:   Med Surg [[id number]]         ED: Date/Time/Mode of Arrival:   ED Arrival Information     Expected Arrival Acuity Means of Arrival Escorted By Service Admission Type    - 6/2/2018 10:02 Urgent Ambulance [facility] General Medicine Urgent    Arrival Complaint    weakness          Chief Complaint:   Chief Complaint   Patient presents with    Fall     patient presents post fall this AM  patient denies hitting head/LOC  denies blood thinners       History of Illness: Jenni Hanson is a 80 y o  female who presents with fall  Patient was walking to get a phone call and "lost her balance"  She fell to the right side and then down to the floor  She denies LOC or cp or sob  She felt well until this occurred  She called life alert and son came to her house   She was sitting on floor he helped her up but sje seemed to be off balance and some left leg weakness    ED Vital Signs:   ED Triage Vitals   Temperature Pulse Respirations Blood Pressure SpO2   06/02/18 1004 06/02/18 1003 06/02/18 1003 06/02/18 1004 06/02/18 1003   97 9 °F (36 6 °C) 78 18 (!) 198/88 94 %      Temp Source Heart Rate Source Patient Position - Orthostatic VS BP Location FiO2 (%)   06/02/18 1004 06/02/18 1004 06/02/18 1004 06/02/18 1004 --   Oral Monitor Lying Right arm       Pain Score       06/02/18 1421       No Pain        Wt Readings from Last 1 Encounters:   06/02/18 78 7 kg (173 lb 8 oz)       Vital Signs (abnormal):   06/02/18 1245 -- 80 18  188/80 95 % -- CH   06/02/18 1100 -- 72 18  198/83 95 % -- DB   06/02/18 1004 -- -- --  198/88 Abnormal Labs/Diagnostic Test Results:  Na  134, gluc  168  CT head- wnl   CXR - wnl     ED Treatment:   Medication Administration from 06/02/2018 1002 to 06/02/2018 1352       Date/Time Order Dose Route Action Action by Comments     06/02/2018 1059 sodium chloride 0 9 % bolus 1,000 mL 1,000 mL Intravenous New Bag Geovani Keys RN           Past Medical/Surgical History: Active Ambulatory Problems     Diagnosis Date Noted    No Active Ambulatory Problems     Resolved Ambulatory Problems     Diagnosis Date Noted    No Resolved Ambulatory Problems     Past Medical History:   Diagnosis Date    Anxiety     Cancer ([facility] [place] [address] )     Hyperlipidemia     Hypertension        Admitting Diagnosis: Weakness [R53 1]  Fall, initial encounter [W19  XXXA]    Age/Sex: 80 y o  female    Assessment/Plan: Hospital Problem List:      Active Problems:    Fall    Hyperlipidemia    Anxiety    Hypertension   Plan for the Primary Problem(s):   Active Problems:    Fall-pt denies LOC-gait unsteady and possible left leg weakness after fall-stroke protocol ordered; neurology consult; check tsh; asa; echo and carotid us; ua neg; PT/OT consults    Hyperlipidemia-check flp; lipitor for now    Anxiety-continue home meds    Hypertension-on lopressor  Elevated random blood glucose-check an a1c and accucks   VTE Prophylaxis: Heparin  / sequential compression device   Code Status:   POLST: POLST is not applicable to this patient    Anticipated Length of Stay:  Patient will be admitted on an Observation basis with an anticipated length of stay of  < 2 midnights     Justification for Hospital Stay: fall    Admission Orders:  Scheduled Meds:   Current Facility-Administered Medications:  aspirin 81 mg Oral Daily Rene Abraham MD   atorvastatin 40 mg Oral QPM Rene Abraham MD   diphenhydrAMINE 25 mg Intravenous Q6H PRN Srinath Fuentes MD   heparin (porcine) 5,000 Units Subcutaneous [facility] Srinath Fuentes MD insulin lispro 1-5 Units Subcutaneous TID AC Summer Abraham MD   insulin lispro 1-5 Units Subcutaneous HS Summerblaine Abraham MD   labetalol 10 mg Intravenous Q6H PRN Rene Abraham MD   LORazepam 0 5 mg Oral BID Rene Abraham MD   metoprolol tartrate 50 mg Oral BID Srinath Fuentes MD   perphenazine-amitriptyline (DUO-DEBBIE 2/10) combo dose  Oral BID Rene Abraham MD   [START ON 6/4/2018] pneumococcal 13-valent conjugate vaccine 0 5 mL Intramuscular Prior to discharge Rene Abraham MD     Continuous Infusions:    PRN Meds: diphenhydrAMINE    labetalol    [START ON 6/4/2018] pneumococcal 13-valent conjugate vaccine    Fingerstick ac and hs   Cons carb diet   Dysphagia eval   Neuro checks  Tele   OT PT eval   Speech eval   Up w/ assist   Neuro consult        Neuro consult  6/3  Assessment/plan,  1  Status post fall with no evidence of any acute stroke on the MRI scan    Check orthostatic blood pressure, physical therapy evaluation, she can follow up with Neurosurgery as an outpatient for her meningioma does not look patient would need surgery, other management as per primary team, case discussed with primary team

## 2018-06-03 NOTE — PLAN OF CARE
Problem: PHYSICAL THERAPY ADULT  Goal: Performs mobility at highest level of function for planned discharge setting  See evaluation for individualized goals  Treatment/Interventions: Functional transfer training, LE strengthening/ROM, Elevations, Therapeutic exercise, Endurance training, Patient/family training, Equipment eval/education, Spoke to nursing  Equipment Recommended:  (TBD)       See flowsheet documentation for full assessment, interventions and recommendations  Prognosis: Good  Problem List: Decreased strength, Decreased endurance, Impaired balance, Decreased mobility  Assessment: Pt is 80 y o  female seen for PT evaluation on 6/3/2018 [facility]p  Fall 2/2 unsteady gait, used alert bracelet to get help  Pt fell to the right side and then down to the floor, denies LOC CP and SOB  PT consulted to assess pt's functional mobility and d/c needs  Order placed for PT eval and tx, w/ up w/ A order  Performed at least 2 patient identifiers during session: Name and wristband  Comorbidities affecting pt's physical performance at time of assessment include: HTN, HLD, anxiety  PTA, pt was independent w/ all functional mobility w/ no AD or DME, ambulates community distances and elevations, has 1-2 RYAN, lives alone in 1 level home and reports she has been independent w/ ADLs/IADLs up until this point  Personal factors affecting pt at time of IE include: stairs to enter home, inability to navigate level surfaces w/o external assistance, limited home support, positive fall history, decreased initiation and engagement and limited insight into impairments   Please find objective findings from PT assessment regarding body systems outlined above with impairments and limitations including weakness, impaired balance, decreased endurance, decreased activity tolerance, decreased functional mobility tolerance, decreased safety awareness and fall risk, as well as mobility assessment (need for contact-guard assist w/ all phases of mobility when usually ambulating independently)  The following objective measures performed on IE also reveal limitations: Barthel Index: 55/100 and Modified Randy: 3 (moderate disability)  Pt's clinical presentation is currently unstable/unpredictable 2/2 ongoing medical management for s/p fall, MRI pending  Pt to benefit from continued PT tx to address deficits as defined above and maximize level of functional independent mobility and consistency  From PT/mobility standpoint, recommendation at time of d/c would be Home PT vs  STR, anticipated pending progress in order to facilitate return to PLOF  Barriers to Discharge: Decreased caregiver support (pt resides alone)     Recommendation: Home PT (vs  STR, anticipated pending progress)     PT - OK to Discharge: No    See flowsheet documentation for full assessment

## 2018-06-03 NOTE — PHYSICAL THERAPY NOTE
Physical Therapy Evaluation     Patient's Name: Shavon Kaplan    Admitting Diagnosis  Weakness [R53 1]  Fall, initial encounter [W19  XXXA]    Problem List  Patient Active Problem List   Diagnosis    Fall    Hyperlipidemia    Anxiety    Hypertension       Past Medical History  Past Medical History:   Diagnosis Date    Anxiety     Cancer ([address] )     breast     Hyperlipidemia     Hypertension        Past Surgical History  Past Surgical History:   Procedure Laterality Date    CARDIAC SURGERY      triple bypass        06/03/18 0808   Note Type   Note type Eval/Treat   Pain Assessment   Pain Assessment No/denies pain   Pain Score No Pain   Home Living   Type of [address] One level;Performs ADLs on one level; Able to live on main level with bedroom/bathroom  (2 steps to enter into living room; 1 RYAN in back of home)   Bathroom Shower/Tub Tub/shower unit   Bathroom Equipment (no DME at baseline)   [address] (no AD at baseline)   Prior Function   Level of [place] Independent with ADLs and functional mobility   Lives With Alone  (pt reports, "everyone just does their own thing" - min suppo)   Receives Help From Family  (son lives down the road to pt)   ADL Assistance Independent   IADLs Independent   Falls in the last 6 months 1 to 4  (pt reports her balance is not good)   Vocational Retired   Restrictions/Precautions   [facility] Bearing Precautions Per Order No   Other Precautions Chair Alarm; Bed Alarm; Fall Risk;Telemetry   General   Additional Pertinent History CT HEAD 6/2/18: No acute intracranial abnormality  Microangiopathic changes; CHEST X-RAY 6/2/18: No active pulmonary disease     Family/Caregiver Present No   Cognition   Overall Cognitive Status WFL   Arousal/Participation Alert   Orientation Level Oriented X4   Following Commands Follows all commands and directions without difficulty   RUE Assessment   RUE Assessment WFL  ( strength = fair)   FORREST Assessment   LUE Assessment WFL  ( strength = fair)   RLE Assessment   RLE Assessment WFL  (4+/5)   LLE Assessment   LLE Assessment WFL  (4+/5)   Coordination   Movements are Fluid and Coordinated 1   Sensation WFL  (pt denies numbness/tingling)   Light Touch   RLE Light Touch Grossly intact   LLE Light Touch Grossly intact   Bed Mobility   Rolling L 5  Supervision   Additional items Assist x 1;HOB elevated; Bedrails   Supine to Sit 5  Supervision   Additional items Assist x 1;HOB elevated; Bedrails; Increased time required   Transfers   Sit to Stand 5  Supervision   Additional items Assist x 1;Verbal cues   Stand to Sit 5  Supervision   Additional items Assist x 1; Armrests; Increased time required   Ambulation/Elevation   Gait pattern Excessively slow  (mild LOB- self corrects; (+) veering)   Gait Assistance 4  Minimal assist  (CGA for safety 2/2 mild unsteadiness)   Additional items Assist x 1;Verbal cues   Assistive Device None   Distance 25' in room   Balance   Static Sitting Good   Dynamic Sitting Fair +   Static Standing Fair +   Dynamic Standing Fair   Ambulatory Fair   Endurance Deficit   Endurance Deficit Yes   Activity Tolerance   Activity Tolerance Patient limited by fatigue   Medical Staff Made Aware pt w/ up w/ A order   Nurse Made Aware Yes, RN Ange Worrell verbalized pt appropriate for PT session; nsg staff made aware of outcomes   Assessment   Prognosis Good   Problem List Decreased strength;Decreased endurance; Impaired balance;Decreased mobility   Assessment Pt is 80 y o  female seen for PT evaluation on 6/3/2018 s/p admit to [facility] on 6/2/2018 w/ s/p  Fall 2/2 unsteady gait, used alert bracelet to get help  Pt fell to the right side and then down to the floor, denies LOC CP and SOB  PT consulted to assess pt's functional mobility and d/c needs  Order placed for PT eval and tx, w/ up w/ A order  Performed at least 2 patient identifiers during session: Name and wristband   Comorbidities affecting pt's physical performance at time of assessment include: HTN, HLD, anxiety  PTA, pt was independent w/ all functional mobility w/ no AD or DME, ambulates community distances and elevations, has 1-2 RYAN, lives alone in 1 level home and reports she has been independent w/ ADLs/IADLs up until this point  Personal factors affecting pt at time of IE include: stairs to enter home, inability to navigate level surfaces w/o external assistance, limited home support, positive fall history, decreased initiation and engagement and limited insight into impairments  Please find objective findings from PT assessment regarding body systems outlined above with impairments and limitations including weakness, impaired balance, decreased endurance, decreased activity tolerance, decreased functional mobility tolerance, decreased safety awareness and fall risk, as well as mobility assessment (need for contact-guard assist w/ all phases of mobility when usually ambulating independently)  The following objective measures performed on IE also reveal limitations: Barthel Index: 55/100 and Modified [place]: 3 (moderate disability)  Pt's clinical presentation is currently unstable/unpredictable 2/2 ongoing medical management for s/p fall, MRI pending  Pt to benefit from continued PT tx to address deficits as defined above and maximize level of functional independent mobility and consistency  From PT/mobility standpoint, recommendation at time of d/c would be Home PT vs  STR, anticipated pending progress in order to facilitate return to PLOF     Barriers to Discharge Decreased caregiver support  (pt resides alone)   Goals   Patient Goals "to get home"   [facility] Expiration Date 06/13/18   Short Term Goal #1 In 7-10 days: Increase bilateral LE strength 1/2 grade to facilitate independent mobility, Perform all bed mobility tasks independently to decrease caregiver burden, Perform all transfers independently to improve independence, Ambulate > 150 ft  with least restrictive assistive device modified independent w/o LOB and w/ normalized gait pattern 100% of the time, Navigate 2 stairs modified independent with unilateral handrail to facilitate return to previous living environment, Increase all balance 1/2 grade to decrease risk for falls, Complete exercise program independently and Tolerate 4 hr OOB to faciliate upright tolerance    Treatment Day 0   Plan   Treatment/Interventions Functional transfer training;LE strengthening/ROM; Elevations; Therapeutic exercise; Endurance training;Patient/family training;Equipment eval/education;Spoke to nursing   PT Frequency 5x/wk   Recommendation   Recommendation Home PT  (vs  STR, anticipated pending progress)   Equipment Recommended (TBD)   PT - OK to Discharge No   Modified [place] Scale   Modified [place] Scale 3   Barthel Index   Feeding 10   Bathing 0   Grooming Score 5   Dressing Score 5   Bladder Score 10   Bowels Score 10   Toilet Use Score 5   Transfers (Bed/Chair) Score 10   Mobility (Level Surface) Score 0   Stairs Score 0   Barthel Index Score 55         Claudia Flood, PT

## 2018-06-03 NOTE — ASSESSMENT & PLAN NOTE
· Appears mechanical however given concern for left-sided weakness stroke pathway initiated  · Neurology consult pending recommendations appreciated  · MRI discussed with Neurology no evidence to suggest acute infarct masslike effect or hemorrhage  Patient is stable from a neurology perspective for discharge  · Continue statin and aspirin at home dose upon discharge  · PT evaluated patient recommending short-term rehab versus home with support pending progress appreciate ongoing recommendations    · Echocardiogram

## 2018-06-03 NOTE — PLAN OF CARE
Problem: DISCHARGE PLANNING - CARE MANAGEMENT  Goal: Discharge to post-acute care or home with appropriate resources  INTERVENTIONS:  - Conduct assessment to determine patient/family and health care team treatment goals, and need for post-acute services based on payer coverage, community resources, and patient preferences, and barriers to discharge  - Address psychosocial, clinical, and financial barriers to discharge as identified in assessment in conjunction with the patient/family and health care team  - Arrange appropriate level of post-acute services according to patient's   needs and preference and payer coverage in collaboration with the physician and health care team  - Communicate with and update the patient/family, physician, and health care team regarding progress on the discharge plan  - Arrange appropriate transportation to post-acute venues   Outcome: Completed Date Met: 06/03/18  CM spoke to [facility] and it was decided that pt should go home with [facility] for PT  RIZWAN spoke to pt and daughter in law Candelario Palafox and they are in agreement with this  Pt to be d/marianela home with [facility] will transport home

## 2018-06-03 NOTE — SOCIAL WORK
CM spoke to [facility]) and it was decided that pt should go home with [facility] for PT  CM spoke to pt and daughter in law Edel Sullivan and they are in agreement with this  Pt to be d/marianela home with [facility] will transport home

## 2018-06-03 NOTE — ASSESSMENT & PLAN NOTE
· Present on admission  · A1c is 6 7  · Continue a c  HS Accu-Cheks along with sliding scale insulin  · Recommend dietary changes upon discharge and follow up with her PCP to consider oral agent

## 2018-06-03 NOTE — ASSESSMENT & PLAN NOTE
· Patient with urgency on admission asymptomatic  · Okay with slightly higher blood pressures await Neurology input

## 2018-06-03 NOTE — PLAN OF CARE
Activity Intolerance/Impaired Mobility     Mobility/activity is maintained at optimum level for patient Progressing        Communication Impairment     Ability to express needs and understand communication Jesisca Mchugh Discharge to home or other facility with appropriate resources Progressing        INFECTION - ADULT     Absence or prevention of progression during hospitalization Progressing     Absence of fever/infection during neutropenic period Progressing        Knowledge Deficit     Patient/family/caregiver demonstrates understanding of disease process, treatment plan, medications, and discharge instructions Progressing        Neurological Deficit     Neurological status is stable or improving Progressing        NEUROSENSORY - ADULT     Achieves stable or improved neurological status Progressing     Absence of seizures Progressing     Remains free of injury related to seizures activity Progressing     Achieves maximal functionality and self care Progressing        Nutrition     Nutrition/Hydration status is improving Progressing        PAIN - ADULT     Verbalizes/displays adequate comfort level or baseline comfort level Progressing        Potential for Aspiration     Non-ventilated patient's risk of aspiration is minimized Progressing     Ventilated patient's risk of aspiration is minimized Progressing        Potential for Falls     Patient will remain free of falls Progressing        SAFETY ADULT     Maintain or return to baseline ADL function Progressing     Maintain or return mobility status to optimal level Progressing

## 2018-06-05 LAB
ATRIAL RATE: 76 BPM
P AXIS: 47 DEGREES
PR INTERVAL: 220 MS
QRS AXIS: 17 DEGREES
QRSD INTERVAL: 120 MS
QT INTERVAL: 410 MS
QTC INTERVAL: 461 MS
T WAVE AXIS: 115 DEGREES
VENTRICULAR RATE: 76 BPM

## 2018-06-05 PROCEDURE — 93010 ELECTROCARDIOGRAM REPORT: CPT | Performed by: INTERNAL MEDICINE

## 2018-06-07 ENCOUNTER — OFFICE VISIT (OUTPATIENT)
Dept: DERMATOLOGY | Facility: CLINIC | Age: 83
End: 2018-06-07
Payer: MEDICARE

## 2018-06-07 DIAGNOSIS — L40.9 PSORIASIS: Primary | ICD-10-CM

## 2018-06-07 DIAGNOSIS — D18.01 CHERRY ANGIOMA: ICD-10-CM

## 2018-06-07 DIAGNOSIS — Z13.89 SCREENING FOR SKIN CONDITION: ICD-10-CM

## 2018-06-07 PROCEDURE — 99213 OFFICE O/P EST LOW 20 MIN: CPT | Performed by: DERMATOLOGY

## 2018-06-07 RX ORDER — ALCLOMETASONE DIPROPIONATE 0.5 MG/G
CREAM TOPICAL 2 TIMES DAILY
Qty: 30 G | Refills: 2 | Status: SHIPPED | OUTPATIENT
Start: 2018-06-07

## 2018-06-07 NOTE — PATIENT INSTRUCTIONS
psoriasis still active will refill her Aclovate cream which we had given to her previously   cherry angioma patient reassured these are normal growths we acquire with age no treatment needed   screening for dermatologic disorders nothing else of concern noted exam follow-up as previously scheduled

## 2018-06-07 NOTE — PROGRESS NOTES
[address] [facility] [address] [facility] DERMATOLOGY  [address] [facility] [address]     MRN: [id number] : 1925  Encounter: [id number]  Patient Information: Demi Mcleod  Chief complaint:Lesion on the back    History of present illness:  66-year-old female presents for overall skin check concerned regarding her psoriasis on her face and up spot that was noticed by her hairdresser on her back  Past Medical History:   Diagnosis Date    Anxiety     Cancer ([address] )     breast     Hyperlipidemia     Hypertension      Past Surgical History:   Procedure Laterality Date    CARDIAC SURGERY      triple bypass     Social History   History   Alcohol Use No     History   Drug Use No     History   Smoking Status    Never Smoker   Smokeless Tobacco    Never Used     No family history on file  Meds/Allergies   Allergies   Allergen Reactions    Hydralazine Shortness Of Breath     Flush and short of breath resolved with Benadryl    Sulfa Antibiotics Anaphylaxis       Meds:  Prior to Admission medications    Medication Sig Start Date End Date Taking?  Authorizing Provider   aspirin 81 MG tablet Take 81 mg by mouth daily   Yes Historical Provider, MD   LORazepam (ATIVAN) 0 5 mg tablet Take 0 5 mg by mouth 2 (two) times a day   Yes Historical Provider, MD   metoprolol tartrate (LOPRESSOR) 25 mg tablet Take 50 mg by mouth 2 (two) times a day     Yes Historical Provider, MD   perphenazine-amitriptyline 2-10 MG TABS Take 1 tablet by mouth 2 (two) times a day   Yes Historical Provider, MD   simvastatin (ZOCOR) 10 mg tablet Take 20 mg by mouth daily at bedtime     Yes Historical Provider, MD       Subjective:     Review of Systems:    General: negative for - chills, fatigue, fever,  weight gain or weight loss  Psychological: negative for - anxiety, behavioral disorder, concentration difficulties, decreased libido, depression, irritability, memory difficulties, mood swings, sleep disturbances or suicidal ideation  ENT: negative for - hearing difficulties , nasal congestion, nasal discharge, oral lesions, sinus pain, sneezing, sore throat  Allergy and Immunology: negative for - hives, insect bite sensitivity,  Hematological and Lymphatic: negative for - bleeding problems, blood clots,bruising, swollen lymph nodes  Endocrine: negative for - hair pattern changes, hot flashes, malaise/lethargy, mood swings, palpitations, polydipsia/polyuria, skin changes, temperature intolerance or unexpected weight change  Respiratory: negative for - cough, hemoptysis, orthopnea, shortness of breath, or wheezing  Cardiovascular: negative for - chest pain, dyspnea on exertion, edema,  Gastrointestinal: negative for - abdominal pain, nausea/vomiting  Genito-Urinary: negative for - dysuria, incontinence, irregular/heavy menses or urinary frequency/urgency  Musculoskeletal: negative for - gait disturbance, joint pain, joint stiffness, joint swelling, muscle pain, muscular weakness  Dermatological:  As in HPI  Neurological: negative for confusion, dizziness, headaches, impaired coordination/balance, memory loss, numbness/tingling, seizures, speech problems, tremors or weakness       Objective: There were no vitals taken for this visit  Physical Exam:    General Appearance:    Alert, cooperative, no distress   Head:    Normocephalic, without obvious abnormality, atraumatic           Skin:   A full skin exam was performed including scalp, head scalp, eyes, ears, nose, lips, neck, chest, axilla, abdomen, back, buttocks, bilateral upper extremities, bilateral lower extremities, hands, feet, fingers, toes, fingernails, and toenails  3 mm purplish papule noted on the back normal keratotic papules with greasy stuck on appearance several other red papules noted greasy scaling erythema noted on the face nothing else remarkable noted on exam     Assessment:     1  Psoriasis  alclomethasone (ACLOVATE) 0 05 % cream   2   Cherry angioma 3  Screening for skin condition           Plan:    psoriasis still active will refill her Aclovate cream which we had given to her previously   cherry angioma patient reassured these are normal growths we acquire with age no treatment needed   screening for dermatologic disorders nothing else of concern noted exam follow-up as previously scheduled      Alexandra Hurtado MD  6/7/2018,2:21 PM    Portions of the record may have been created with voice recognition software   Occasional wrong word or "sound a like" substitutions may have occurred due to the inherent limitations of voice recognition software   Read the chart carefully and recognize, using context, where substitutions have occurred

## 2018-10-06 ENCOUNTER — HOSPITAL ENCOUNTER (EMERGENCY)
Facility: HOSPITAL | Age: 83
Discharge: HOME | End: 2018-10-06
Attending: EMERGENCY MEDICINE
Payer: MEDICARE

## 2018-10-06 VITALS
SYSTOLIC BLOOD PRESSURE: 153 MMHG | DIASTOLIC BLOOD PRESSURE: 68 MMHG | WEIGHT: 164.68 LBS | HEART RATE: 72 BPM | BODY MASS INDEX: 31.12 KG/M2 | RESPIRATION RATE: 16 BRPM | TEMPERATURE: 99 F | OXYGEN SATURATION: 93 %

## 2018-10-06 DIAGNOSIS — K64.9 HEMORRHOIDS: ICD-10-CM

## 2018-10-06 DIAGNOSIS — N39.0 UTI (URINARY TRACT INFECTION): Primary | ICD-10-CM

## 2018-10-06 LAB
BACTERIA UR QL AUTO: ABNORMAL /HPF
BILIRUB UR QL STRIP: NEGATIVE
CLARITY UR: ABNORMAL
COLOR UR: ABNORMAL
GLUCOSE UR STRIP-MCNC: NEGATIVE MG/DL
HGB UR QL STRIP.AUTO: ABNORMAL
KETONES UR STRIP-MCNC: NEGATIVE MG/DL
LEUKOCYTE ESTERASE UR QL STRIP: ABNORMAL
NITRITE UR QL STRIP: NEGATIVE
NON-SQ EPI CELLS URNS QL MICRO: ABNORMAL /HPF
PH UR STRIP.AUTO: 5.5 [PH] (ref 4.5–8)
PROT UR STRIP-MCNC: ABNORMAL MG/DL
RBC #/AREA URNS AUTO: ABNORMAL /HPF
SP GR UR STRIP.AUTO: 1.01 (ref 1–1.03)
UROBILINOGEN UR QL STRIP.AUTO: 0.2 E.U./DL
WBC #/AREA URNS AUTO: ABNORMAL /HPF

## 2018-10-06 PROCEDURE — 99283 EMERGENCY DEPT VISIT LOW MDM: CPT

## 2018-10-06 PROCEDURE — 81001 URINALYSIS AUTO W/SCOPE: CPT | Performed by: EMERGENCY MEDICINE

## 2018-10-06 PROCEDURE — 87086 URINE CULTURE/COLONY COUNT: CPT | Performed by: EMERGENCY MEDICINE

## 2018-10-06 RX ORDER — CEPHALEXIN 500 MG/1
500 CAPSULE ORAL EVERY 12 HOURS SCHEDULED
Qty: 14 CAPSULE | Refills: 0 | Status: SHIPPED | OUTPATIENT
Start: 2018-10-06 | End: 2018-10-13

## 2018-10-06 RX ORDER — CEPHALEXIN 250 MG/1
500 CAPSULE ORAL ONCE
Status: COMPLETED | OUTPATIENT
Start: 2018-10-06 | End: 2018-10-06

## 2018-10-06 RX ORDER — ONDANSETRON 4 MG/1
4 TABLET, ORALLY DISINTEGRATING ORAL ONCE
Status: COMPLETED | OUTPATIENT
Start: 2018-10-06 | End: 2018-10-06

## 2018-10-06 RX ORDER — DIAPER,BRIEF,INFANT-TODD,DISP
EACH MISCELLANEOUS 4 TIMES DAILY PRN
Status: DISCONTINUED | OUTPATIENT
Start: 2018-10-06 | End: 2018-10-06 | Stop reason: HOSPADM

## 2018-10-06 RX ADMIN — CEPHALEXIN 500 MG: 250 CAPSULE ORAL at 19:33

## 2018-10-06 RX ADMIN — HYDROCORTISONE: 1 CREAM TOPICAL at 19:41

## 2018-10-06 RX ADMIN — ONDANSETRON 4 MG: 4 TABLET, ORALLY DISINTEGRATING ORAL at 19:33

## 2018-10-06 NOTE — ED PROVIDER NOTES
History  Chief Complaint   Patient presents with    Urinary Frequency     pt co of urinary frequency and itching onset 3 days ago      HPI  25-year-old female with history of hypertension, hyperlipidemia, anxiety, CAD status post CABG presents to the emergency department with complaints urinary frequency and malaise  Patient states that for the past 2 days, she has felt slightly unwell and has had decreased appetite  Today, she noted urinary frequency a similar to that which she has had in the past with urinary tract infections  She has not noted any modifying factors for her symptoms and states that despite decreased appetite, she has been making sure to stay hydrated  On review of systems, she complains anal pruritus, but denies any recent fevers, chills, headache, lightheadedness, dizziness, chest pain, shortness of breath, abdominal pain, flank pain, or complaints other than stated above  Prior to Admission Medications   Prescriptions Last Dose Informant Patient Reported? Taking? LORazepam (ATIVAN) 0 5 mg tablet   Yes No   Sig: Take 0 5 mg by mouth 2 (two) times a day   alclomethasone (ACLOVATE) 0 05 % cream   No No   Sig: Apply topically 2 (two) times a day To facial rash as needed   aspirin 81 MG tablet   Yes No   Sig: Take 81 mg by mouth daily   metoprolol tartrate (LOPRESSOR) 25 mg tablet   Yes No   Sig: Take 50 mg by mouth 2 (two) times a day     perphenazine-amitriptyline 2-10 MG TABS   Yes No   Sig: Take 1 tablet by mouth 2 (two) times a day   simvastatin (ZOCOR) 10 mg tablet   Yes No   Sig: Take 20 mg by mouth daily at bedtime        Facility-Administered Medications: None       Past Medical History:   Diagnosis Date    Anxiety     Cancer ([address] )     breast     Hyperlipidemia     Hypertension        Past Surgical History:   Procedure Laterality Date    CARDIAC SURGERY      triple bypass       History reviewed  No pertinent family history    I have reviewed and agree with the history as documented  Social History   Substance Use Topics    Smoking status: Never Smoker    Smokeless tobacco: Never Used    Alcohol use No        Review of Systems   Constitutional: Negative for chills and fever  Respiratory: Negative for shortness of breath  Gastrointestinal: Negative for abdominal pain, nausea and vomiting  Genitourinary: Positive for frequency  Musculoskeletal: Negative for arthralgias and joint swelling  Skin: Negative for rash and wound  Allergic/Immunologic: Negative for immunocompromised state  Neurological: Negative for headaches  Psychiatric/Behavioral: The patient is not nervous/anxious  Physical Exam  Physical Exam   Constitutional: She is oriented to person, place, and time  She appears well-nourished  No distress  HENT:   Head: Normocephalic and atraumatic  Eyes: EOM are normal    Neck: Normal range of motion  Neck supple  Cardiovascular: Normal rate and regular rhythm  Murmur heard  Systolic murmur is present   Pulmonary/Chest: Effort normal and breath sounds normal  No respiratory distress  Abdominal: Soft  She exhibits no distension  There is no tenderness  Genitourinary: Rectal exam shows external hemorrhoid  Musculoskeletal: Normal range of motion  Neurological: She is alert and oriented to person, place, and time  Skin: Skin is warm and dry  She is not diaphoretic  Psychiatric: She has a normal mood and affect  Her behavior is normal    Nursing note and vitals reviewed        Vital Signs  ED Triage Vitals   Temperature Pulse Respirations Blood Pressure SpO2   10/06/18 1824 10/06/18 1822 10/06/18 1822 10/06/18 1822 10/06/18 1822   99 °F (37 2 °C) 76 16 (!) 213/86 94 %      Temp Source Heart Rate Source Patient Position - Orthostatic VS BP Location FiO2 (%)   10/06/18 1824 10/06/18 1822 10/06/18 1822 10/06/18 1822 --   Oral Monitor Sitting Right arm       Pain Score       --                  Vitals:    10/06/18 1822 10/06/18 1915 10/06/18 1930   BP: (!) 213/86 (!) 204/84 153/68   Pulse: 76 73 72   Patient Position - Orthostatic VS: Sitting  Lying       Visual Acuity      ED Medications  Medications   ondansetron (ZOFRAN-ODT) dispersible tablet 4 mg (4 mg Oral Given 10/1933)   cephalexin (KEFLEX) capsule 500 mg (500 mg Oral Given 10/1933)       Diagnostic Studies  Results Reviewed     Procedure Component Value Units Date/Time    Urine culture [[id number]] Collected:  10/06/18 1945    Lab Status:  Final result Specimen:  Urine from Urine, Clean Catch Updated:  10/08/18 1611     Urine Culture >100,000 cfu/ml     Urine Microscopic [[id number]]  (Abnormal) Collected:  10/06/18 1901    Lab Status:  Final result Specimen:  Urine from Urine, Clean Catch Updated:  10/06/18 1917     RBC, UA 0-1 (A) /hpf      WBC, UA 2-4 (A) /hpf      Epithelial Cells Occasional /hpf      Bacteria, UA Occasional /hpf     UA w Reflex to Microscopic w Reflex to Culture [[id number]]  (Abnormal) Collected:  10/06/18 1901    Lab Status:  Final result Specimen:  Urine from Urine, Clean Catch Updated:  10/06/18 1910     Color, UA Light Yellow     Clarity, UA Slightly Cloudy     Specific [place], UA 1 010     pH, UA 5 5     Leukocytes, UA Small (A)     Nitrite, UA Negative     Protein, UA Trace (A) mg/dl      Glucose, UA Negative mg/dl      Ketones, UA Negative mg/dl      Urobilinogen, UA 0 2 E U /dl      Bilirubin, UA Negative     Blood, UA Trace-lysed (A)                 No orders to display              Procedures  Procedures       Phone Contacts  ED Phone Contact    ED Course  ED Course as of Oct 11 0113   Sat Oct 06, 2018   1918 WBC, UA: (!) 2-4                               MDM  Number of Diagnoses or Management Options  Hemorrhoids:   UTI (urinary tract infection):   Diagnosis management comments: 80year-old female with urinary symptoms and mild generalized malaise and decreased appetite    Patient also complains of anal itching, external hemorrhoid present on exam  Will obtain UA, likely discharge home with antibiotics and preparation H as well as PCP follow-up/return precautions for new or worsening symptoms  CritCare Time    Disposition  Final diagnoses:   UTI (urinary tract infection)   Hemorrhoids     Time reflects when diagnosis was documented in both MDM as applicable and the Disposition within this note     Time User Action Codes Description Comment    10/6/2018  7:35 PM Otilia Chowssa Add [N39 0] UTI (urinary tract infection)     10/6/2018  7:35 PM Peng Kessler Add [K64 9] Hemorrhoids       ED Disposition     ED Disposition Condition Comment    Discharge  [address] discharge to home/self care      Condition at discharge: Good        Follow-up Information     Follow up With Specialties Details Why Contact Info Additional Information    Kapil Lebron, DO Internal Medicine In 3 days As needed [facility], [address] [place] [address] Emergency Department Emergency Medicine  If symptoms worsen [facility] [address]  [phone] [place] ED, [address] [place] [address], [facility]          Discharge Medication List as of 10/6/2018  7:39 PM      START taking these medications    Details   cephalexin (KEFLEX) 500 mg capsule Take 1 capsule (500 mg total) by mouth every 12 (twelve) hours for 7 days, Starting Sat 10/6/2018, Until Sat 10/13/2018, Normal         CONTINUE these medications which have NOT CHANGED    Details   alclomethasone (ACLOVATE) 0 05 % cream Apply topically 2 (two) times a day To facial rash as needed, Starting Thu 6/7/2018, Normal      aspirin 81 MG tablet Take 81 mg by mouth daily, Until Discontinued, Historical Med      LORazepam (ATIVAN) 0 5 mg tablet Take 0 5 mg by mouth 2 (two) times a day, Until Discontinued, Historical Med      metoprolol tartrate (LOPRESSOR) 25 mg tablet Take 50 mg by mouth 2 (two) times a day  , Until Discontinued, Historical Med perphenazine-amitriptyline 2-10 MG TABS Take 1 tablet by mouth 2 (two) times a day, Until Discontinued, Historical Med      simvastatin (ZOCOR) 10 mg tablet Take 20 mg by mouth daily at bedtime  , Until Discontinued, Historical Med           No discharge procedures on file      ED Provider  Electronically Signed by           Jailene Jung MD  10/11/18 9129

## 2018-10-06 NOTE — DISCHARGE INSTRUCTIONS
Urinary Tract Infection in Women   WHAT YOU NEED TO KNOW:   A urinary tract infection (UTI) is caused by bacteria that get inside your urinary tract  Most bacteria that enter your urinary tract come out when you urinate  If the bacteria stay in your urinary tract, you may get an infection  Your urinary tract includes your kidneys, ureters, bladder, and urethra  Urine is made in your kidneys, and it flows from the ureters to the bladder  Urine leaves the bladder through the urethra  A UTI is more common in your lower urinary tract, which includes your bladder and urethra  DISCHARGE INSTRUCTIONS:   Return to the emergency department if:   · You are urinating very little or not at all  · You have a high fever with shaking chills  · You have side or back pain that gets worse  Contact your healthcare provider if:   · You have a fever  · You do not feel better after 2 days of taking antibiotics  · You are vomiting  · You have questions or concerns about your condition or care  Medicines:   · Antibiotics  help fight a bacterial infection  · Medicines  may be given to decrease pain and burning when you urinate  They will also help decrease the feeling that you need to urinate often  These medicines will make your urine orange or red  · Take your medicine as directed  Contact your healthcare provider if you think your medicine is not helping or if you have side effects  Tell him or her if you are allergic to any medicine  Keep a list of the medicines, vitamins, and herbs you take  Include the amounts, and when and why you take them  Bring the list or the pill bottles to follow-up visits  Carry your medicine list with you in case of an emergency  Follow up with your healthcare provider as directed:  Write down your questions so you remember to ask them during your visits  Prevent another UTI:   · Empty your bladder often  Urinate and empty your bladder as soon as you feel the need   Do not hold your urine for long periods of time  · Wipe from front to back after you urinate or have a bowel movement  This will help prevent germs from getting into your urinary tract through your urethra  · Drink liquids as directed  Ask how much liquid to drink each day and which liquids are best for you  You may need to drink more liquids than usual to help flush out the bacteria  Do not drink alcohol, caffeine, or citrus juices  These can irritate your bladder and increase your symptoms  Your healthcare provider may recommend cranberry juice to help prevent a UTI  · Urinate after you have sex  This can help flush out bacteria passed during sex  · Do not douche or use feminine deodorants  These can change the chemical balance in your vagina  · Change sanitary pads or tampons often  This will help prevent germs from getting into your urinary tract  · Do pelvic muscle exercises often  Pelvic muscle exercises may help you start and stop urinating  Strong pelvic muscles may help you empty your bladder easier  Squeeze these muscles tightly for 5 seconds like you are trying to hold back urine  Then relax for 5 seconds  Gradually work up to squeezing for 10 seconds  Do 3 sets of 15 repetitions a day, or as directed  © 2017 [employer]  Information is for End User's use only and may not be sold, redistributed or otherwise used for commercial purposes  All illustrations and images included in CareNotes® are the copyrighted property of [employer]  or [employer]  The above information is an  only  It is not intended as medical advice for individual conditions or treatments  Talk to your doctor, nurse or pharmacist before following any medical regimen to see if it is safe and effective for you  Hemorrhoids   WHAT YOU NEED TO KNOW:   Hemorrhoids are swollen blood vessels inside your rectum (internal hemorrhoids) or on your anus (external hemorrhoids)  Sometimes a hemorrhoid may prolapse  This means it extends out of your anus  DISCHARGE INSTRUCTIONS:   Return to the emergency department if:   · You have severe pain in your rectum or around your anus  · You have severe pain in your abdomen and you are vomiting  · You have bleeding from your anus that soaks through your underwear  Contact your healthcare provider if:   · You have frequent and painful bowel movements  · Your hemorrhoid looks or feels more swollen than usual      · You do not have a bowel movement for 2 days or more  · You see or feel tissue coming through your anus  · You have questions or concerns about your condition or care  Medicines: You may  need any of the following:  · A pad, cream, or ointment  can help decrease pain, swelling, and itching  · Stool softeners  help treat or prevent constipation  · NSAIDs , such as ibuprofen, help decrease swelling, pain, and fever  NSAIDs can cause stomach bleeding or kidney problems in certain people  If you take blood thinner medicine, always ask your healthcare provider if NSAIDs are safe for you  Always read the medicine label and follow directions  · Take your medicine as directed  Contact your healthcare provider if you think your medicine is not helping or if you have side effects  Tell him or her if you are allergic to any medicine  Keep a list of the medicines, vitamins, and herbs you take  Include the amounts, and when and why you take them  Bring the list or the pill bottles to follow-up visits  Carry your medicine list with you in case of an emergency  Manage your symptoms:   · Apply ice on your anus for 15 to 20 minutes every hour or as directed  Use an ice pack, or put crushed ice in a plastic bag  Cover it with a towel before you apply it to your anus  Ice helps prevent tissue damage and decreases swelling and pain  · Take a sitz bath  Fill a bathtub with 4 to 6 inches of warm water   You may also use a sitz bath pan that fits inside a toilet bowl  Sit in the sitz bath for 15 minutes  Do this 3 times a day, and after each bowel movement  The warm water can help decrease pain and swelling  · Keep your anal area clean  Gently wash the area with warm water daily  Soap may irritate the area  After a bowel movement, wipe with moist towelettes or wet toilet paper  Dry toilet paper can irritate the area  Prevent hemorrhoids:   · Do not strain to have a bowel movement  Do not sit on the toilet too long  These actions can increase pressure on the tissues in your rectum and anus  · Drink plenty of liquids  Liquids can help prevent constipation  Ask how much liquid to drink each day and which liquids are best for you  · Eat a variety of high-fiber foods  Examples include fruits, vegetables, and whole grains  Ask your healthcare provider how much fiber you need each day  You may need to take a fiber supplement  · Exercise as directed  Exercise, such as walking, may make it easier to have a bowel movement  Ask your healthcare provider to help you create an exercise plan  · Do not have anal sex  Anal sex can weaken the skin around your rectum and anus  · Avoid heavy lifting  This can cause straining and increase your risk for another hemorrhoid  Follow up with your healthcare provider as directed:  Write down your questions so you remember to ask them during your visits  © 2017 [employer] Information is for End User's use only and may not be sold, redistributed or otherwise used for commercial purposes  All illustrations and images included in CareNotes® are the copyrighted property of [employer]  or [employer]  The above information is an  only  It is not intended as medical advice for individual conditions or treatments   Talk to your doctor, nurse or pharmacist before following any medical regimen to see if it is safe and effective for you

## 2018-10-08 LAB — BACTERIA UR CULT: NORMAL

## 2018-10-16 ENCOUNTER — HOSPITAL ENCOUNTER (EMERGENCY)
Facility: HOSPITAL | Age: 83
Discharge: HOME | End: 2018-10-16
Attending: EMERGENCY MEDICINE
Payer: MEDICARE

## 2018-10-16 VITALS
TEMPERATURE: 97.8 F | OXYGEN SATURATION: 96 % | HEART RATE: 76 BPM | DIASTOLIC BLOOD PRESSURE: 78 MMHG | RESPIRATION RATE: 18 BRPM | SYSTOLIC BLOOD PRESSURE: 187 MMHG

## 2018-10-16 DIAGNOSIS — N39.0 URINARY TRACT INFECTION IN FEMALE: Primary | ICD-10-CM

## 2018-10-16 LAB
BACTERIA UR QL AUTO: ABNORMAL /HPF
BILIRUB UR QL STRIP: NEGATIVE
CLARITY UR: CLEAR
COLOR UR: YELLOW
GLUCOSE UR STRIP-MCNC: NEGATIVE MG/DL
HGB UR QL STRIP.AUTO: ABNORMAL
KETONES UR STRIP-MCNC: NEGATIVE MG/DL
LEUKOCYTE ESTERASE UR QL STRIP: ABNORMAL
NITRITE UR QL STRIP: NEGATIVE
NON-SQ EPI CELLS URNS QL MICRO: ABNORMAL /HPF
PH UR STRIP.AUTO: 6 [PH] (ref 4.5–8)
PROT UR STRIP-MCNC: NEGATIVE MG/DL
RBC #/AREA URNS AUTO: ABNORMAL /HPF
SP GR UR STRIP.AUTO: 1.02 (ref 1–1.03)
UROBILINOGEN UR QL STRIP.AUTO: 0.2 E.U./DL
WBC #/AREA URNS AUTO: ABNORMAL /HPF

## 2018-10-16 PROCEDURE — 81001 URINALYSIS AUTO W/SCOPE: CPT | Performed by: NURSE PRACTITIONER

## 2018-10-16 PROCEDURE — 99283 EMERGENCY DEPT VISIT LOW MDM: CPT

## 2018-10-16 RX ORDER — CIPROFLOXACIN 500 MG/1
500 TABLET, FILM COATED ORAL ONCE
Status: COMPLETED | OUTPATIENT
Start: 2018-10-16 | End: 2018-10-16

## 2018-10-16 RX ORDER — CIPROFLOXACIN 500 MG/1
500 TABLET, FILM COATED ORAL 2 TIMES DAILY
Qty: 10 TABLET | Refills: 0 | Status: SHIPPED | OUTPATIENT
Start: 2018-10-16 | End: 2018-10-21

## 2018-10-16 RX ADMIN — CIPROFLOXACIN 500 MG: 500 TABLET, FILM COATED ORAL at 21:04

## 2018-10-17 NOTE — ED PROVIDER NOTES
History  Chief Complaint   Patient presents with    Urinary Frequency     Patient reports being seen here last week for UTI, sent home on antibiotic  Finished antibiotic and still having symptoms  41-year-old female presents here with a chief complaint of urinary frequency and urinary urgency  She believes she has urinary tract infection she was seen here the other day and given cephalexin therapy reports that she has 1 more day but she still feels symptomatic  She denies any fever chills  She denies flank pain  She has no suprapubic tenderness  She does not appear toxic or febrile  She is well-hydrated  She is requesting ciprofloxacin which has worked well for her in the past   Her urine culture from the other day shows makes contaminants will repeat the urine and prescribe Cipro            Prior to Admission Medications   Prescriptions Last Dose Informant Patient Reported? Taking? LORazepam (ATIVAN) 0 5 mg tablet   Yes Yes   Sig: Take 0 5 mg by mouth 2 (two) times a day   alclomethasone (ACLOVATE) 0 05 % cream   No Yes   Sig: Apply topically 2 (two) times a day To facial rash as needed   aspirin 81 MG tablet   Yes Yes   Sig: Take 81 mg by mouth daily   metoprolol tartrate (LOPRESSOR) 25 mg tablet   Yes Yes   Sig: Take 50 mg by mouth 2 (two) times a day     perphenazine-amitriptyline 2-10 MG TABS   Yes Yes   Sig: Take 1 tablet by mouth 2 (two) times a day   simvastatin (ZOCOR) 10 mg tablet   Yes Yes   Sig: Take 20 mg by mouth daily at bedtime        Facility-Administered Medications: None       Past Medical History:   Diagnosis Date    Anxiety     Cancer ([facility] [address] )     breast     Depression     Hyperlipidemia     Hypertension        Past Surgical History:   Procedure Laterality Date    BREAST SURGERY      CARDIAC SURGERY      triple bypass       History reviewed  No pertinent family history  I have reviewed and agree with the history as documented      Social History   Substance Use Topics    Smoking status: Never Smoker    Smokeless tobacco: Never Used    Alcohol use No        Review of Systems   Constitutional: Negative for activity change, fatigue and fever  HENT: Negative for congestion, ear pain, rhinorrhea and sore throat  Eyes: Negative  Respiratory: Negative for cough, shortness of breath and wheezing  Gastrointestinal: Negative for abdominal pain, diarrhea, nausea and vomiting  Endocrine: Negative  Genitourinary: Positive for dysuria, frequency and urgency  Negative for difficulty urinating, dyspareunia, flank pain, menstrual problem, pelvic pain, vaginal bleeding, vaginal discharge and vaginal pain  Musculoskeletal: Negative for arthralgias and myalgias  Skin: Negative for color change and pallor  Neurological: Negative for dizziness, speech difficulty, weakness and headaches  Hematological: Negative for adenopathy  Psychiatric/Behavioral: Negative for confusion  Physical Exam  Physical Exam   Constitutional: She is oriented to person, place, and time  She appears well-developed and well-nourished  She is cooperative  Non-toxic appearance  She does not have a sickly appearance  She does not appear ill  No distress  HENT:   Head: Normocephalic and atraumatic  Right Ear: Tympanic membrane and external ear normal    Left Ear: Tympanic membrane and external ear normal    Nose: No rhinorrhea, sinus tenderness or nasal deformity  No epistaxis  Right sinus exhibits no maxillary sinus tenderness and no frontal sinus tenderness  Left sinus exhibits no maxillary sinus tenderness and no frontal sinus tenderness  Mouth/Throat: Oropharynx is clear and moist and mucous membranes are normal  Normal dentition  Eyes: Pupils are equal, round, and reactive to light  EOM are normal    Neck: Normal range of motion  Neck supple  Cardiovascular: Normal rate, regular rhythm and normal heart sounds  No murmur heard    Pulmonary/Chest: Effort normal and breath sounds normal  No accessory muscle usage  No respiratory distress  She has no wheezes  She has no rales  She exhibits no tenderness  Abdominal: Soft  She exhibits no distension  There is no guarding  Musculoskeletal: Normal range of motion  She exhibits no edema or tenderness  Lymphadenopathy:     She has no cervical adenopathy  Neurological: She is alert and oriented to person, place, and time  She exhibits normal muscle tone  Skin: Skin is warm and dry  No rash noted  No erythema  Psychiatric: She has a normal mood and affect  Nursing note and vitals reviewed        Vital Signs  ED Triage Vitals   Temperature Pulse Respirations Blood Pressure SpO2   10/16/18 1919 10/16/18 1919 10/16/18 1919 10/16/18 1920 10/16/18 1919   97 8 °F (36 6 °C) 76 18 (!) 187/78 96 %      Temp Source Heart Rate Source Patient Position - Orthostatic VS BP Location FiO2 (%)   10/16/18 1919 10/16/18 1919 10/16/18 1919 10/16/18 1919 --   Oral Monitor Sitting Left arm       Pain Score       10/16/18 1919       No Pain           Vitals:    10/16/18 1919 10/16/18 1920   BP:  (!) 187/78   Pulse: 76    Patient Position - Orthostatic VS: Sitting        Visual Acuity      ED Medications  Medications   ciprofloxacin (CIPRO) tablet 500 mg (500 mg Oral Given 10/16/18 2104)   ciprofloxacin (CIPRO) tablet 500 mg (500 mg Oral Given 10/16/18 2104)       Diagnostic Studies  Results Reviewed     Procedure Component Value Units Date/Time    Urine Microscopic [[id number]]  (Abnormal) Collected:  10/16/18 2006    Lab Status:  Final result Specimen:  Urine from Urine, Clean Catch Updated:  10/16/18 2023     RBC, UA 0-1 (A) /hpf      WBC, UA 4-10 (A) /hpf      Epithelial Cells Occasional /hpf      Bacteria, UA Occasional /hpf     UA w Reflex to Microscopic w Reflex to Culture [[id number]]  (Abnormal) Collected:  10/16/18 2006    Lab Status:  Final result Specimen:  Urine from Urine, Clean Catch Updated:  10/16/18 2015     Color, UA Yellow     Clarity, UA Clear Specific [place], UA 1 020     pH, UA 6 0     Leukocytes, UA Small (A)     Nitrite, UA Negative     Protein, UA Negative mg/dl      Glucose, UA Negative mg/dl      Ketones, UA Negative mg/dl      Urobilinogen, UA 0 2 E U /dl      Bilirubin, UA Negative     Blood, UA Small (A)                 No orders to display              Procedures  Procedures       Phone Contacts  ED Phone Contact    ED Course                               MDM  Number of Diagnoses or Management Options  Urinary tract infection in female: new and requires workup  Diagnosis management comments: No focal abdominal tenderness on physical exam no CVA tenderness  Patient does not look toxic  Previous culture came back as makes contaminants will repeat culture and provide patient with the medication that she is requesting which is ciprofloxacin at this point  Return precautions discussed with the patient  Recommend follow-up with Urology instructed the patient call the office tomorrow to schedule follow-up appointment  Amount and/or Complexity of Data Reviewed  Clinical lab tests: reviewed and ordered  Review and summarize past medical records: yes  Independent visualization of images, tracings, or specimens: yes    Patient Progress  Patient progress: stable    CritCare Time    Disposition  Final diagnoses:   Urinary tract infection in female     Time reflects when diagnosis was documented in both MDM as applicable and the Disposition within this note     Time User Action Codes Description Comment    10/16/2018  9:00 PM Jv Dodd Add [N39 0] Urinary tract infection in female       ED Disposition     ED Disposition Condition Comment    Discharge  [address] [place] discharge to home/self care      Condition at discharge: Good        Follow-up Information     Follow up With Specialties Details Why Contact Info Additional [address] [facility], DO Internal Medicine   [address] [facility] Urology Schedule an appointment as soon as possible for a visit For Continued Evaluation [address]  [place] [address]  708  [facility] [place], [address] [place], [place], [address]          Discharge Medication List as of 10/16/2018  9:13 PM      START taking these medications    Details   ciprofloxacin (CIPRO) 500 mg tablet Take 1 tablet (500 mg total) by mouth 2 (two) times a day for 5 days, Starting Tue 10/16/2018, Until Sun 10/21/2018, Normal         CONTINUE these medications which have NOT CHANGED    Details   alclomethasone (ACLOVATE) 0 05 % cream Apply topically 2 (two) times a day To facial rash as needed, Starting Thu 6/7/2018, Normal      aspirin 81 MG tablet Take 81 mg by mouth daily, Until Discontinued, Historical Med      LORazepam (ATIVAN) 0 5 mg tablet Take 0 5 mg by mouth 2 (two) times a day, Until Discontinued, Historical Med      metoprolol tartrate (LOPRESSOR) 25 mg tablet Take 50 mg by mouth 2 (two) times a day  , Until Discontinued, Historical Med      perphenazine-amitriptyline 2-10 MG TABS Take 1 tablet by mouth 2 (two) times a day, Until Discontinued, Historical Med      simvastatin (ZOCOR) 10 mg tablet Take 20 mg by mouth daily at bedtime  , Until Discontinued, Historical Med           No discharge procedures on file      ED Provider  Electronically Signed by           SHAMEKA Sales  10/16/18 6620

## 2018-10-17 NOTE — DISCHARGE INSTRUCTIONS
Urinary Tract Infection in Women   WHAT YOU NEED TO KNOW:   A urinary tract infection (UTI) is caused by bacteria that get inside your urinary tract  Most bacteria that enter your urinary tract come out when you urinate  If the bacteria stay in your urinary tract, you may get an infection  Your urinary tract includes your kidneys, ureters, bladder, and urethra  Urine is made in your kidneys, and it flows from the ureters to the bladder  Urine leaves the bladder through the urethra  A UTI is more common in your lower urinary tract, which includes your bladder and urethra  DISCHARGE INSTRUCTIONS:   Return to the emergency department if:   · You are urinating very little or not at all  · You have a high fever with shaking chills  · You have side or back pain that gets worse  Contact your healthcare provider if:   · You have a fever  · You do not feel better after 2 days of taking antibiotics  · You are vomiting  · You have questions or concerns about your condition or care  Medicines:   · Antibiotics  help fight a bacterial infection  · Medicines  may be given to decrease pain and burning when you urinate  They will also help decrease the feeling that you need to urinate often  These medicines will make your urine orange or red  · Take your medicine as directed  Contact your healthcare provider if you think your medicine is not helping or if you have side effects  Tell him or her if you are allergic to any medicine  Keep a list of the medicines, vitamins, and herbs you take  Include the amounts, and when and why you take them  Bring the list or the pill bottles to follow-up visits  Carry your medicine list with you in case of an emergency  Follow up with your healthcare provider as directed:  Write down your questions so you remember to ask them during your visits  Prevent another UTI:   · Empty your bladder often    Urinate and empty your bladder as soon as you feel the need  Do not hold your urine for long periods of time  · Wipe from front to back after you urinate or have a bowel movement  This will help prevent germs from getting into your urinary tract through your urethra  · Drink liquids as directed  Ask how much liquid to drink each day and which liquids are best for you  You may need to drink more liquids than usual to help flush out the bacteria  Do not drink alcohol, caffeine, or citrus juices  These can irritate your bladder and increase your symptoms  Your healthcare provider may recommend cranberry juice to help prevent a UTI  · Urinate after you have sex  This can help flush out bacteria passed during sex  · Do not douche or use feminine deodorants  These can change the chemical balance in your vagina  · Change sanitary pads or tampons often  This will help prevent germs from getting into your urinary tract  · Do pelvic muscle exercises often  Pelvic muscle exercises may help you start and stop urinating  Strong pelvic muscles may help you empty your bladder easier  Squeeze these muscles tightly for 5 seconds like you are trying to hold back urine  Then relax for 5 seconds  Gradually work up to squeezing for 10 seconds  Do 3 sets of 15 repetitions a day, or as directed  © 2017 [facility] Information is for End User's use only and may not be sold, redistributed or otherwise used for commercial purposes  All illustrations and images included in CareNotes® are the copyrighted property of [employer]  or [employer]varez  The above information is an  only  It is not intended as medical advice for individual conditions or treatments  Talk to your doctor, nurse or pharmacist before following any medical regimen to see if it is safe and effective for you        Urinary Traction Infection in Older Adults   WHAT YOU NEED TO KNOW:   A urinary tract infection (UTI) is caused by bacteria that get inside your urinary tract  Your urinary tract includes your kidneys, ureters, bladder, and urethra  Urine is made in your kidneys, and it flows from the ureters to the bladder  Urine leaves the bladder through the urethra  A UTI is more common in your lower urinary tract, which includes your bladder and urethra  DISCHARGE INSTRUCTIONS:   Return to the emergency department if:   · You are urinating very little or not at all  · You are vomiting  · You have a high fever with shaking chills  · You have side or back pain that gets worse  Contact your healthcare provider if:   · You have a fever  · You are a woman and you have increased white or yellow discharge from your vagina  · You do not feel better after 2 days of taking antibiotics  · You have questions or concerns about your condition or care  Medicines:   · Medicines  help treat the bacterial infection or decrease pain and burning when you urinate  You may also need medicines to decrease the urge to urinate often  Your healthcare provider may recommend cranberry juice or cranberry supplements to help decrease your symptoms  · Take your medicine as directed  Contact your healthcare provider if you think your medicine is not helping or if you have side effects  Tell him or her if you are allergic to any medicine  Keep a list of the medicines, vitamins, and herbs you take  Include the amounts, and when and why you take them  Bring the list or the pill bottles to follow-up visits  Carry your medicine list with you in case of an emergency  Self-care:   · Urinate when you feel the urge  Do not hold your urine because bacteria can grow in the bladder if urine stays in the bladder too long  It may be helpful to urinate at least every 3 to 4 hours  · Drink liquids as directed  Liquids can help flush bacteria from your urinary tract  Ask how much liquid to drink each day and which liquids are best for you   You may need to drink more liquids than usual to help flush out the bacteria  Do not drink alcohol, caffeine, and citrus juices  These can irritate your bladder and increase your symptoms  · Apply heat  on your abdomen for 20 to 30 minutes every 2 hours for as many days as directed  Heat helps decrease discomfort and pressure in your bladder  Prevent a UTI:   · Women should wipe front to back  after urinating or having a bowel movement  This may prevent germs from getting into the urinary tract  · Urinate after you have sex  to flush away bacteria that can enter your urinary tract during sex  · Wear cotton underwear and clothes that fit loose  Tight pants and nylon underwear can trap moisture and cause bacteria to grow  Follow up with your healthcare provider as directed:  Write down your questions so you remember to ask them during your visits  © 2017 [address] [employer] Information is for End User's use only and may not be sold, redistributed or otherwise used for commercial purposes  All illustrations and images included in CareNotes® are the copyrighted property of A D A M , Inc  or [employer] Alvarez  The above information is an  only  It is not intended as medical advice for individual conditions or treatments  Talk to your doctor, nurse or pharmacist before following any medical regimen to see if it is safe and effective for you

## 2018-10-19 ENCOUNTER — TELEPHONE (OUTPATIENT)
Dept: UROLOGY | Facility: MEDICAL CENTER | Age: 83
End: 2018-10-19

## 2018-10-19 NOTE — TELEPHONE ENCOUNTER
Patient had urinary frequency and UTI  Can be scheduled for next available new patient appointment and can see PCP for UTI while waiting to be seen

## 2018-10-19 NOTE — TELEPHONE ENCOUNTER
Reason for appointment/Complaint/Diagnosis : [address] 10/16, UTI    Insurance: MCR    History of Cancer? Yes                      If yes, what kind? Breast Cancer    Previous urologist?  No      Records requested/where? No    Outside testing/where? No    Location Preference for office visit? [place]    ** Pt visited [facility] 10/16 for UTI, would like to schedule fup in [facility], please advise on appropriate appt    Greg[address] 2

## 2019-06-11 ENCOUNTER — HOSPITAL ENCOUNTER (INPATIENT)
Facility: HOSPITAL | Age: 84
LOS: 3 days | Discharge: HOME HEALTH | DRG: 242 | End: 2019-06-14
Attending: ANESTHESIOLOGY | Admitting: ANESTHESIOLOGY
Payer: MEDICARE

## 2019-06-11 ENCOUNTER — APPOINTMENT (EMERGENCY)
Dept: RADIOLOGY | Facility: HOSPITAL | Age: 84
End: 2019-06-11
Payer: MEDICARE

## 2019-06-11 ENCOUNTER — HOSPITAL ENCOUNTER (EMERGENCY)
Facility: HOSPITAL | Age: 84
End: 2019-06-11
Attending: EMERGENCY MEDICINE | Admitting: EMERGENCY MEDICINE
Payer: MEDICARE

## 2019-06-11 VITALS
HEIGHT: 62 IN | DIASTOLIC BLOOD PRESSURE: 63 MMHG | HEART RATE: 29 BPM | BODY MASS INDEX: 32.05 KG/M2 | RESPIRATION RATE: 28 BRPM | OXYGEN SATURATION: 95 % | TEMPERATURE: 99.7 F | SYSTOLIC BLOOD PRESSURE: 149 MMHG | WEIGHT: 174.16 LBS

## 2019-06-11 DIAGNOSIS — R00.1 BRADYCARDIA: Primary | ICD-10-CM

## 2019-06-11 DIAGNOSIS — R05.9 COUGH: ICD-10-CM

## 2019-06-11 DIAGNOSIS — I44.1 MOBITZ TYPE II ATRIOVENTRICULAR BLOCK: ICD-10-CM

## 2019-06-11 DIAGNOSIS — I35.0 AORTIC STENOSIS, SEVERE: Chronic | ICD-10-CM

## 2019-06-11 DIAGNOSIS — I44.2 COMPLETE HEART BLOCK (HCC): Primary | ICD-10-CM

## 2019-06-11 DIAGNOSIS — I45.9 HEART BLOCK: ICD-10-CM

## 2019-06-11 PROBLEM — N17.9 AKI (ACUTE KIDNEY INJURY) (HCC): Status: ACTIVE | Noted: 2019-06-11

## 2019-06-11 PROBLEM — R57.0 CARDIOGENIC SHOCK (HCC): Status: ACTIVE | Noted: 2019-06-11

## 2019-06-11 LAB
ALBUMIN SERPL BCP-MCNC: 3.4 G/DL (ref 3.5–5)
ALBUMIN SERPL BCP-MCNC: 3.8 G/DL (ref 3.5–5)
ALP SERPL-CCNC: 80 U/L (ref 46–116)
ALP SERPL-CCNC: 94 U/L (ref 46–116)
ALT SERPL W P-5'-P-CCNC: 143 U/L (ref 12–78)
ALT SERPL W P-5'-P-CCNC: 167 U/L (ref 12–78)
ANION GAP BLD CALC-SCNC: 18 MMOL/L (ref 4–13)
ANION GAP SERPL CALCULATED.3IONS-SCNC: 10 MMOL/L (ref 4–13)
ANION GAP SERPL CALCULATED.3IONS-SCNC: 13 MMOL/L (ref 4–13)
APTT PPP: 26 SECONDS (ref 26–38)
AST SERPL W P-5'-P-CCNC: 144 U/L (ref 5–45)
AST SERPL W P-5'-P-CCNC: 227 U/L (ref 5–45)
BASOPHILS # BLD AUTO: 0.05 THOUSANDS/ΜL (ref 0–0.1)
BASOPHILS NFR BLD AUTO: 0 % (ref 0–1)
BILIRUB SERPL-MCNC: 0.3 MG/DL (ref 0.2–1)
BILIRUB SERPL-MCNC: 0.39 MG/DL (ref 0.2–1)
BUN BLD-MCNC: 26 MG/DL (ref 5–25)
BUN SERPL-MCNC: 28 MG/DL (ref 5–25)
BUN SERPL-MCNC: 30 MG/DL (ref 5–25)
CA-I BLD-SCNC: 1.22 MMOL/L (ref 1.12–1.32)
CALCIUM SERPL-MCNC: 9.2 MG/DL (ref 8.3–10.1)
CALCIUM SERPL-MCNC: 9.5 MG/DL (ref 8.3–10.1)
CHLORIDE BLD-SCNC: 106 MMOL/L (ref 100–108)
CHLORIDE SERPL-SCNC: 104 MMOL/L (ref 100–108)
CHLORIDE SERPL-SCNC: 111 MMOL/L (ref 100–108)
CO2 SERPL-SCNC: 19 MMOL/L (ref 21–32)
CO2 SERPL-SCNC: 23 MMOL/L (ref 21–32)
CREAT BLD-MCNC: 1.4 MG/DL (ref 0.6–1.3)
CREAT SERPL-MCNC: 1.57 MG/DL (ref 0.6–1.3)
CREAT SERPL-MCNC: 1.69 MG/DL (ref 0.6–1.3)
EOSINOPHIL # BLD AUTO: 0.08 THOUSAND/ΜL (ref 0–0.61)
EOSINOPHIL NFR BLD AUTO: 1 % (ref 0–6)
ERYTHROCYTE [DISTWIDTH] IN BLOOD BY AUTOMATED COUNT: 13.6 % (ref 11.6–15.1)
GFR SERPL CREATININE-BSD FRML MDRD: 26 ML/MIN/1.73SQ M
GFR SERPL CREATININE-BSD FRML MDRD: 28 ML/MIN/1.73SQ M
GFR SERPL CREATININE-BSD FRML MDRD: 32 ML/MIN/1.73SQ M
GLUCOSE SERPL-MCNC: 169 MG/DL (ref 65–140)
GLUCOSE SERPL-MCNC: 201 MG/DL (ref 65–140)
GLUCOSE SERPL-MCNC: 207 MG/DL (ref 65–140)
HCT VFR BLD AUTO: 38.4 % (ref 34.8–46.1)
HCT VFR BLD CALC: 36 % (ref 34.8–46.1)
HGB BLD-MCNC: 12.4 G/DL (ref 11.5–15.4)
HGB BLDA-MCNC: 12.2 G/DL (ref 11.5–15.4)
IMM GRANULOCYTES # BLD AUTO: 0.11 THOUSAND/UL (ref 0–0.2)
IMM GRANULOCYTES NFR BLD AUTO: 1 % (ref 0–2)
INR PPP: 1.03 (ref 0.86–1.17)
INR PPP: 1.11 (ref 0.86–1.17)
LACTATE SERPL-SCNC: 1.8 MMOL/L (ref 0.5–2)
LACTATE SERPL-SCNC: 3.5 MMOL/L (ref 0.5–2)
LYMPHOCYTES # BLD AUTO: 1.81 THOUSANDS/ΜL (ref 0.6–4.47)
LYMPHOCYTES NFR BLD AUTO: 16 % (ref 14–44)
MAGNESIUM SERPL-MCNC: 1.8 MG/DL (ref 1.6–2.6)
MAGNESIUM SERPL-MCNC: 2 MG/DL (ref 1.6–2.6)
MCH RBC QN AUTO: 32.7 PG (ref 26.8–34.3)
MCHC RBC AUTO-ENTMCNC: 32.3 G/DL (ref 31.4–37.4)
MCV RBC AUTO: 101 FL (ref 82–98)
MONOCYTES # BLD AUTO: 0.65 THOUSAND/ΜL (ref 0.17–1.22)
MONOCYTES NFR BLD AUTO: 6 % (ref 4–12)
NEUTROPHILS # BLD AUTO: 8.42 THOUSANDS/ΜL (ref 1.85–7.62)
NEUTS SEG NFR BLD AUTO: 76 % (ref 43–75)
NRBC BLD AUTO-RTO: 1 /100 WBCS
NT-PROBNP SERPL-MCNC: 3584 PG/ML
PCO2 BLD: 21 MMOL/L (ref 21–32)
PHOSPHATE SERPL-MCNC: 4.6 MG/DL (ref 2.3–4.1)
PLATELET # BLD AUTO: 177 THOUSANDS/UL (ref 149–390)
PLATELET # BLD AUTO: 218 THOUSANDS/UL (ref 149–390)
PMV BLD AUTO: 11.5 FL (ref 8.9–12.7)
PMV BLD AUTO: 11.5 FL (ref 8.9–12.7)
POTASSIUM BLD-SCNC: 5 MMOL/L (ref 3.5–5.3)
POTASSIUM SERPL-SCNC: 4.9 MMOL/L (ref 3.5–5.3)
POTASSIUM SERPL-SCNC: 5.2 MMOL/L (ref 3.5–5.3)
PROT SERPL-MCNC: 6.7 G/DL (ref 6.4–8.2)
PROT SERPL-MCNC: 7.6 G/DL (ref 6.4–8.2)
PROTHROMBIN TIME: 13.6 SECONDS (ref 11.8–14.2)
PROTHROMBIN TIME: 14.2 SECONDS (ref 11.8–14.2)
RBC # BLD AUTO: 3.79 MILLION/UL (ref 3.81–5.12)
SODIUM BLD-SCNC: 138 MMOL/L (ref 136–145)
SODIUM SERPL-SCNC: 140 MMOL/L (ref 136–145)
SODIUM SERPL-SCNC: 140 MMOL/L (ref 136–145)
SPECIMEN SOURCE: ABNORMAL
TROPONIN I SERPL-MCNC: <0.02 NG/ML
TSH SERPL DL<=0.05 MIU/L-ACNC: 1.08 UIU/ML (ref 0.36–3.74)
WBC # BLD AUTO: 11.12 THOUSAND/UL (ref 4.31–10.16)

## 2019-06-11 PROCEDURE — 83605 ASSAY OF LACTIC ACID: CPT | Performed by: EMERGENCY MEDICINE

## 2019-06-11 PROCEDURE — 71045 X-RAY EXAM CHEST 1 VIEW: CPT

## 2019-06-11 PROCEDURE — 83735 ASSAY OF MAGNESIUM: CPT | Performed by: EMERGENCY MEDICINE

## 2019-06-11 PROCEDURE — 85730 THROMBOPLASTIN TIME PARTIAL: CPT | Performed by: EMERGENCY MEDICINE

## 2019-06-11 PROCEDURE — 99283 EMERGENCY DEPT VISIT LOW MDM: CPT | Performed by: EMERGENCY MEDICINE

## 2019-06-11 PROCEDURE — 80053 COMPREHEN METABOLIC PANEL: CPT | Performed by: EMERGENCY MEDICINE

## 2019-06-11 PROCEDURE — 85025 COMPLETE CBC W/AUTO DIFF WBC: CPT | Performed by: EMERGENCY MEDICINE

## 2019-06-11 PROCEDURE — 93005 ELECTROCARDIOGRAM TRACING: CPT

## 2019-06-11 PROCEDURE — 83880 ASSAY OF NATRIURETIC PEPTIDE: CPT | Performed by: EMERGENCY MEDICINE

## 2019-06-11 PROCEDURE — 80047 BASIC METABLC PNL IONIZED CA: CPT

## 2019-06-11 PROCEDURE — 85014 HEMATOCRIT: CPT

## 2019-06-11 PROCEDURE — 96365 THER/PROPH/DIAG IV INF INIT: CPT

## 2019-06-11 PROCEDURE — 84443 ASSAY THYROID STIM HORMONE: CPT | Performed by: EMERGENCY MEDICINE

## 2019-06-11 PROCEDURE — 85610 PROTHROMBIN TIME: CPT | Performed by: EMERGENCY MEDICINE

## 2019-06-11 PROCEDURE — 96375 TX/PRO/DX INJ NEW DRUG ADDON: CPT

## 2019-06-11 PROCEDURE — 84100 ASSAY OF PHOSPHORUS: CPT | Performed by: EMERGENCY MEDICINE

## 2019-06-11 PROCEDURE — 84484 ASSAY OF TROPONIN QUANT: CPT | Performed by: EMERGENCY MEDICINE

## 2019-06-11 PROCEDURE — 99285 EMERGENCY DEPT VISIT HI MDM: CPT

## 2019-06-11 PROCEDURE — 99291 CRITICAL CARE FIRST HOUR: CPT | Performed by: ANESTHESIOLOGY

## 2019-06-11 PROCEDURE — 85049 AUTOMATED PLATELET COUNT: CPT | Performed by: EMERGENCY MEDICINE

## 2019-06-11 PROCEDURE — 36415 COLL VENOUS BLD VENIPUNCTURE: CPT | Performed by: EMERGENCY MEDICINE

## 2019-06-11 RX ORDER — CHLORHEXIDINE GLUCONATE 0.12 MG/ML
15 RINSE ORAL EVERY 12 HOURS SCHEDULED
Status: DISCONTINUED | OUTPATIENT
Start: 2019-06-11 | End: 2019-06-14 | Stop reason: HOSPADM

## 2019-06-11 RX ORDER — LORAZEPAM 0.5 MG/1
0.5 TABLET ORAL 2 TIMES DAILY
COMMUNITY

## 2019-06-11 RX ORDER — SODIUM CHLORIDE, SODIUM LACTATE, POTASSIUM CHLORIDE, CALCIUM CHLORIDE 600; 310; 30; 20 MG/100ML; MG/100ML; MG/100ML; MG/100ML
75 INJECTION, SOLUTION INTRAVENOUS CONTINUOUS
Status: DISCONTINUED | OUTPATIENT
Start: 2019-06-11 | End: 2019-06-12

## 2019-06-11 RX ORDER — DOPAMINE HYDROCHLORIDE 160 MG/100ML
1-20 INJECTION, SOLUTION INTRAVENOUS CONTINUOUS
Status: DISCONTINUED | OUTPATIENT
Start: 2019-06-11 | End: 2019-06-11 | Stop reason: HOSPADM

## 2019-06-11 RX ORDER — SIMVASTATIN 10 MG
20 TABLET ORAL
COMMUNITY

## 2019-06-11 RX ORDER — PERPHENAZINE AND AMITRIPTYLINE HYDROCHLORIDE 2; 10 MG/1; MG/1
1 TABLET, FILM COATED ORAL 2 TIMES DAILY
COMMUNITY

## 2019-06-11 RX ORDER — ASPIRIN 81 MG/1
81 TABLET, CHEWABLE ORAL DAILY
COMMUNITY

## 2019-06-11 RX ORDER — ALCLOMETASONE DIPROPIONATE 0.5 MG/G
1 CREAM TOPICAL 2 TIMES DAILY
COMMUNITY

## 2019-06-11 RX ORDER — DOPAMINE HYDROCHLORIDE 160 MG/100ML
1-20 INJECTION, SOLUTION INTRAVENOUS
Status: DISCONTINUED | OUTPATIENT
Start: 2019-06-11 | End: 2019-06-12

## 2019-06-11 RX ORDER — HEPARIN SODIUM 5000 [USP'U]/ML
5000 INJECTION, SOLUTION INTRAVENOUS; SUBCUTANEOUS EVERY 8 HOURS SCHEDULED
Status: DISCONTINUED | OUTPATIENT
Start: 2019-06-11 | End: 2019-06-14 | Stop reason: HOSPADM

## 2019-06-11 RX ADMIN — HEPARIN SODIUM 5000 UNITS: 5000 INJECTION INTRAVENOUS; SUBCUTANEOUS at 23:19

## 2019-06-11 RX ADMIN — DOPAMINE HYDROCHLORIDE 2 MCG/KG/MIN: 160 INJECTION, SOLUTION INTRAVENOUS at 20:16

## 2019-06-11 RX ADMIN — SODIUM CHLORIDE, SODIUM LACTATE, POTASSIUM CHLORIDE, AND CALCIUM CHLORIDE 50 ML/HR: .6; .31; .03; .02 INJECTION, SOLUTION INTRAVENOUS at 22:41

## 2019-06-11 RX ADMIN — CHLORHEXIDINE GLUCONATE 0.12% ORAL RINSE 15 ML: 1.2 LIQUID ORAL at 23:19

## 2019-06-11 RX ADMIN — DOPAMINE HYDROCHLORIDE 6 MCG/KG/MIN: 160 INJECTION, SOLUTION INTRAVENOUS at 22:48

## 2019-06-11 RX ADMIN — SODIUM BICARBONATE 50 MEQ: 84 INJECTION, SOLUTION INTRAVENOUS at 19:20

## 2019-06-11 RX ADMIN — CALCIUM GLUCONATE 2 G: 98 INJECTION, SOLUTION INTRAVENOUS at 19:09

## 2019-06-12 ENCOUNTER — ANESTHESIA (INPATIENT)
Dept: NON INVASIVE DIAGNOSTICS | Facility: HOSPITAL | Age: 84
DRG: 242 | End: 2019-06-12
Payer: MEDICARE

## 2019-06-12 ENCOUNTER — APPOINTMENT (INPATIENT)
Dept: NON INVASIVE DIAGNOSTICS | Facility: CLINIC | Age: 84
DRG: 242 | End: 2019-06-12
Payer: MEDICARE

## 2019-06-12 ENCOUNTER — APPOINTMENT (INPATIENT)
Dept: RADIOLOGY | Facility: HOSPITAL | Age: 84
DRG: 242 | End: 2019-06-12
Payer: MEDICARE

## 2019-06-12 PROBLEM — R57.0 CARDIOGENIC SHOCK (HCC): Status: RESOLVED | Noted: 2019-06-11 | Resolved: 2019-06-12

## 2019-06-12 PROBLEM — I25.10 CORONARY ARTERY DISEASE INVOLVING NATIVE CORONARY ARTERY WITHOUT ANGINA PECTORIS: Chronic | Status: ACTIVE | Noted: 2019-06-12

## 2019-06-12 PROBLEM — I35.0 AORTIC STENOSIS, SEVERE: Chronic | Status: ACTIVE | Noted: 2019-06-12

## 2019-06-12 LAB
ANION GAP SERPL CALCULATED.3IONS-SCNC: 6 MMOL/L (ref 4–13)
ATRIAL RATE: 31 BPM
ATRIAL RATE: 35 BPM
ATRIAL RATE: 48 BPM
ATRIAL RATE: 89 BPM
BASOPHILS # BLD AUTO: 0.03 THOUSANDS/ΜL (ref 0–0.1)
BASOPHILS NFR BLD AUTO: 0 % (ref 0–1)
BUN SERPL-MCNC: 32 MG/DL (ref 5–25)
CALCIUM SERPL-MCNC: 9.1 MG/DL (ref 8.3–10.1)
CHLORIDE SERPL-SCNC: 108 MMOL/L (ref 100–108)
CO2 SERPL-SCNC: 27 MMOL/L (ref 21–32)
CREAT SERPL-MCNC: 1.42 MG/DL (ref 0.6–1.3)
EOSINOPHIL # BLD AUTO: 0.01 THOUSAND/ΜL (ref 0–0.61)
EOSINOPHIL NFR BLD AUTO: 0 % (ref 0–6)
ERYTHROCYTE [DISTWIDTH] IN BLOOD BY AUTOMATED COUNT: 13.2 % (ref 11.6–15.1)
GFR SERPL CREATININE-BSD FRML MDRD: 32 ML/MIN/1.73SQ M
GLUCOSE SERPL-MCNC: 167 MG/DL (ref 65–140)
HCT VFR BLD AUTO: 34 % (ref 34.8–46.1)
HGB BLD-MCNC: 11.4 G/DL (ref 11.5–15.4)
IMM GRANULOCYTES # BLD AUTO: 0.08 THOUSAND/UL (ref 0–0.2)
IMM GRANULOCYTES NFR BLD AUTO: 1 % (ref 0–2)
LYMPHOCYTES # BLD AUTO: 2.11 THOUSANDS/ΜL (ref 0.6–4.47)
LYMPHOCYTES NFR BLD AUTO: 16 % (ref 14–44)
MAGNESIUM SERPL-MCNC: 2 MG/DL (ref 1.6–2.6)
MCH RBC QN AUTO: 33.3 PG (ref 26.8–34.3)
MCHC RBC AUTO-ENTMCNC: 33.5 G/DL (ref 31.4–37.4)
MCV RBC AUTO: 99 FL (ref 82–98)
MONOCYTES # BLD AUTO: 1.11 THOUSAND/ΜL (ref 0.17–1.22)
MONOCYTES NFR BLD AUTO: 8 % (ref 4–12)
NEUTROPHILS # BLD AUTO: 9.84 THOUSANDS/ΜL (ref 1.85–7.62)
NEUTS SEG NFR BLD AUTO: 75 % (ref 43–75)
NRBC BLD AUTO-RTO: 0 /100 WBCS
P AXIS: 31 DEGREES
P AXIS: 46 DEGREES
P AXIS: 48 DEGREES
P AXIS: 52 DEGREES
PHOSPHATE SERPL-MCNC: 4.5 MG/DL (ref 2.3–4.1)
PLATELET # BLD AUTO: 190 THOUSANDS/UL (ref 149–390)
PMV BLD AUTO: 11.9 FL (ref 8.9–12.7)
POTASSIUM SERPL-SCNC: 4.3 MMOL/L (ref 3.5–5.3)
PR INTERVAL: 204 MS
PR INTERVAL: 238 MS
PR INTERVAL: 238 MS
QRS AXIS: -36 DEGREES
QRS AXIS: -5 DEGREES
QRS AXIS: 12 DEGREES
QRS AXIS: 152 DEGREES
QRSD INTERVAL: 113 MS
QRSD INTERVAL: 142 MS
QRSD INTERVAL: 146 MS
QRSD INTERVAL: 86 MS
QT INTERVAL: 552 MS
QT INTERVAL: 578 MS
QT INTERVAL: 596 MS
QT INTERVAL: 667 MS
QTC INTERVAL: 390 MS
QTC INTERVAL: 441 MS
QTC INTERVAL: 479 MS
QTC INTERVAL: 533 MS
RBC # BLD AUTO: 3.42 MILLION/UL (ref 3.81–5.12)
SODIUM SERPL-SCNC: 141 MMOL/L (ref 136–145)
T WAVE AXIS: 103 DEGREES
T WAVE AXIS: 124 DEGREES
T WAVE AXIS: 124 DEGREES
T WAVE AXIS: 51 DEGREES
TROPONIN I SERPL-MCNC: 0.07 NG/ML
TROPONIN I SERPL-MCNC: 0.1 NG/ML
TROPONIN I SERPL-MCNC: 0.23 NG/ML
VENTRICULAR RATE: 30 BPM
VENTRICULAR RATE: 31 BPM
VENTRICULAR RATE: 35 BPM
VENTRICULAR RATE: 48 BPM
WBC # BLD AUTO: 13.18 THOUSAND/UL (ref 4.31–10.16)

## 2019-06-12 PROCEDURE — 99233 SBSQ HOSP IP/OBS HIGH 50: CPT | Performed by: INTERNAL MEDICINE

## 2019-06-12 PROCEDURE — 84484 ASSAY OF TROPONIN QUANT: CPT | Performed by: EMERGENCY MEDICINE

## 2019-06-12 PROCEDURE — 93010 ELECTROCARDIOGRAM REPORT: CPT | Performed by: INTERNAL MEDICINE

## 2019-06-12 PROCEDURE — C1785 PMKR, DUAL, RATE-RESP: HCPCS

## 2019-06-12 PROCEDURE — 85025 COMPLETE CBC W/AUTO DIFF WBC: CPT | Performed by: EMERGENCY MEDICINE

## 2019-06-12 PROCEDURE — 84484 ASSAY OF TROPONIN QUANT: CPT | Performed by: PHYSICIAN ASSISTANT

## 2019-06-12 PROCEDURE — C1898 LEAD, PMKR, OTHER THAN TRANS: HCPCS

## 2019-06-12 PROCEDURE — 80048 BASIC METABOLIC PNL TOTAL CA: CPT | Performed by: EMERGENCY MEDICINE

## 2019-06-12 PROCEDURE — 93005 ELECTROCARDIOGRAM TRACING: CPT

## 2019-06-12 PROCEDURE — 02HK3JZ INSERTION OF PACEMAKER LEAD INTO RIGHT VENTRICLE, PERCUTANEOUS APPROACH: ICD-10-PCS | Performed by: INTERNAL MEDICINE

## 2019-06-12 PROCEDURE — 33208 INSRT HEART PM ATRIAL & VENT: CPT | Performed by: INTERNAL MEDICINE

## 2019-06-12 PROCEDURE — 02H63JZ INSERTION OF PACEMAKER LEAD INTO RIGHT ATRIUM, PERCUTANEOUS APPROACH: ICD-10-PCS | Performed by: INTERNAL MEDICINE

## 2019-06-12 PROCEDURE — C8929 TTE W OR WO FOL WCON,DOPPLER: HCPCS

## 2019-06-12 PROCEDURE — C1769 GUIDE WIRE: HCPCS

## 2019-06-12 PROCEDURE — 93306 TTE W/DOPPLER COMPLETE: CPT | Performed by: INTERNAL MEDICINE

## 2019-06-12 PROCEDURE — 71045 X-RAY EXAM CHEST 1 VIEW: CPT

## 2019-06-12 PROCEDURE — 99222 1ST HOSP IP/OBS MODERATE 55: CPT | Performed by: INTERNAL MEDICINE

## 2019-06-12 PROCEDURE — 84100 ASSAY OF PHOSPHORUS: CPT | Performed by: EMERGENCY MEDICINE

## 2019-06-12 PROCEDURE — 0JH606Z INSERTION OF PACEMAKER, DUAL CHAMBER INTO CHEST SUBCUTANEOUS TISSUE AND FASCIA, OPEN APPROACH: ICD-10-PCS | Performed by: INTERNAL MEDICINE

## 2019-06-12 PROCEDURE — 33208 INSRT HEART PM ATRIAL & VENT: CPT

## 2019-06-12 PROCEDURE — 83735 ASSAY OF MAGNESIUM: CPT | Performed by: EMERGENCY MEDICINE

## 2019-06-12 PROCEDURE — C1892 INTRO/SHEATH,FIXED,PEEL-AWAY: HCPCS

## 2019-06-12 RX ORDER — DEXAMETHASONE SODIUM PHOSPHATE 10 MG/ML
INJECTION, SOLUTION INTRAMUSCULAR; INTRAVENOUS AS NEEDED
Status: DISCONTINUED | OUTPATIENT
Start: 2019-06-12 | End: 2019-06-12 | Stop reason: SURG

## 2019-06-12 RX ORDER — ASPIRIN 81 MG/1
81 TABLET, CHEWABLE ORAL DAILY
Status: DISCONTINUED | OUTPATIENT
Start: 2019-06-12 | End: 2019-06-14 | Stop reason: HOSPADM

## 2019-06-12 RX ORDER — GENTAMICIN SULFATE 40 MG/ML
INJECTION, SOLUTION INTRAMUSCULAR; INTRAVENOUS CODE/TRAUMA/SEDATION MEDICATION
Status: COMPLETED | OUTPATIENT
Start: 2019-06-12 | End: 2019-06-12

## 2019-06-12 RX ORDER — PRAVASTATIN SODIUM 40 MG
40 TABLET ORAL
Status: DISCONTINUED | OUTPATIENT
Start: 2019-06-12 | End: 2019-06-14 | Stop reason: HOSPADM

## 2019-06-12 RX ORDER — ONDANSETRON 2 MG/ML
4 INJECTION INTRAMUSCULAR; INTRAVENOUS EVERY 8 HOURS PRN
Status: DISCONTINUED | OUTPATIENT
Start: 2019-06-12 | End: 2019-06-14 | Stop reason: HOSPADM

## 2019-06-12 RX ORDER — SODIUM CHLORIDE 9 MG/ML
INJECTION, SOLUTION INTRAVENOUS CONTINUOUS PRN
Status: DISCONTINUED | OUTPATIENT
Start: 2019-06-12 | End: 2019-06-12 | Stop reason: SURG

## 2019-06-12 RX ORDER — KETAMINE HYDROCHLORIDE 50 MG/ML
INJECTION, SOLUTION, CONCENTRATE INTRAMUSCULAR; INTRAVENOUS AS NEEDED
Status: DISCONTINUED | OUTPATIENT
Start: 2019-06-12 | End: 2019-06-12 | Stop reason: SURG

## 2019-06-12 RX ORDER — FENTANYL CITRATE 50 UG/ML
INJECTION, SOLUTION INTRAMUSCULAR; INTRAVENOUS AS NEEDED
Status: DISCONTINUED | OUTPATIENT
Start: 2019-06-12 | End: 2019-06-12 | Stop reason: SURG

## 2019-06-12 RX ORDER — LIDOCAINE HYDROCHLORIDE 10 MG/ML
INJECTION, SOLUTION INFILTRATION; PERINEURAL CODE/TRAUMA/SEDATION MEDICATION
Status: COMPLETED | OUTPATIENT
Start: 2019-06-12 | End: 2019-06-12

## 2019-06-12 RX ORDER — CEFAZOLIN SODIUM 1 G/50ML
SOLUTION INTRAVENOUS AS NEEDED
Status: DISCONTINUED | OUTPATIENT
Start: 2019-06-12 | End: 2019-06-12 | Stop reason: SURG

## 2019-06-12 RX ORDER — ONDANSETRON 2 MG/ML
INJECTION INTRAMUSCULAR; INTRAVENOUS AS NEEDED
Status: DISCONTINUED | OUTPATIENT
Start: 2019-06-12 | End: 2019-06-12 | Stop reason: SURG

## 2019-06-12 RX ORDER — CALCIUM CARBONATE 200(500)MG
1000 TABLET,CHEWABLE ORAL ONCE
Status: COMPLETED | OUTPATIENT
Start: 2019-06-12 | End: 2019-06-12

## 2019-06-12 RX ORDER — CEFAZOLIN SODIUM 2 G/50ML
2000 SOLUTION INTRAVENOUS ONCE
Status: DISCONTINUED | OUTPATIENT
Start: 2019-06-12 | End: 2019-06-14 | Stop reason: HOSPADM

## 2019-06-12 RX ORDER — GUAIFENESIN 600 MG
600 TABLET, EXTENDED RELEASE 12 HR ORAL EVERY 12 HOURS SCHEDULED
Status: DISCONTINUED | OUTPATIENT
Start: 2019-06-12 | End: 2019-06-14 | Stop reason: HOSPADM

## 2019-06-12 RX ORDER — MIDAZOLAM HYDROCHLORIDE 1 MG/ML
INJECTION INTRAMUSCULAR; INTRAVENOUS AS NEEDED
Status: DISCONTINUED | OUTPATIENT
Start: 2019-06-12 | End: 2019-06-12 | Stop reason: SURG

## 2019-06-12 RX ADMIN — PRAVASTATIN SODIUM 40 MG: 40 TABLET ORAL at 18:49

## 2019-06-12 RX ADMIN — HEPARIN SODIUM 5000 UNITS: 5000 INJECTION INTRAVENOUS; SUBCUTANEOUS at 06:16

## 2019-06-12 RX ADMIN — MIDAZOLAM 1 MG: 1 INJECTION INTRAMUSCULAR; INTRAVENOUS at 15:56

## 2019-06-12 RX ADMIN — HEPARIN SODIUM 5000 UNITS: 5000 INJECTION INTRAVENOUS; SUBCUTANEOUS at 21:52

## 2019-06-12 RX ADMIN — ASPIRIN 81 MG 81 MG: 81 TABLET ORAL at 10:14

## 2019-06-12 RX ADMIN — ONDANSETRON 4 MG: 2 INJECTION INTRAMUSCULAR; INTRAVENOUS at 17:03

## 2019-06-12 RX ADMIN — LIDOCAINE HYDROCHLORIDE 20 ML: 10 INJECTION, SOLUTION INFILTRATION; PERINEURAL at 16:21

## 2019-06-12 RX ADMIN — CHLORHEXIDINE GLUCONATE 0.12% ORAL RINSE 15 ML: 1.2 LIQUID ORAL at 21:51

## 2019-06-12 RX ADMIN — PERFLUTREN 0.4 ML/MIN: 6.52 INJECTION, SUSPENSION INTRAVENOUS at 10:13

## 2019-06-12 RX ADMIN — CHLORHEXIDINE GLUCONATE 0.12% ORAL RINSE 15 ML: 1.2 LIQUID ORAL at 10:15

## 2019-06-12 RX ADMIN — DEXAMETHASONE SODIUM PHOSPHATE 10 MG: 10 INJECTION, SOLUTION INTRAMUSCULAR; INTRAVENOUS at 17:04

## 2019-06-12 RX ADMIN — FENTANYL CITRATE 25 MCG: 50 INJECTION, SOLUTION INTRAMUSCULAR; INTRAVENOUS at 16:25

## 2019-06-12 RX ADMIN — GUAIFENESIN 600 MG: 600 TABLET, EXTENDED RELEASE ORAL at 21:51

## 2019-06-12 RX ADMIN — SODIUM CHLORIDE: 0.9 INJECTION, SOLUTION INTRAVENOUS at 15:46

## 2019-06-12 RX ADMIN — IOHEXOL 10 ML: 350 INJECTION, SOLUTION INTRAVENOUS at 16:26

## 2019-06-12 RX ADMIN — CALCIUM CARBONATE (ANTACID) CHEW TAB 500 MG 1000 MG: 500 CHEW TAB at 08:03

## 2019-06-12 RX ADMIN — AMITRIPTYLINE HYDROCHLORIDE: 10 TABLET, FILM COATED ORAL at 21:51

## 2019-06-12 RX ADMIN — CEFAZOLIN SODIUM 1000 MG: 1 SOLUTION INTRAVENOUS at 16:02

## 2019-06-12 RX ADMIN — GENTAMICIN SULFATE 80 MG: 40 INJECTION, SOLUTION INTRAMUSCULAR; INTRAVENOUS at 16:41

## 2019-06-12 RX ADMIN — FENTANYL CITRATE 25 MCG: 50 INJECTION, SOLUTION INTRAMUSCULAR; INTRAVENOUS at 16:40

## 2019-06-12 RX ADMIN — AMITRIPTYLINE HYDROCHLORIDE: 10 TABLET, FILM COATED ORAL at 10:16

## 2019-06-12 RX ADMIN — KETAMINE HYDROCHLORIDE 10 MG: 50 INJECTION, SOLUTION INTRAMUSCULAR; INTRAVENOUS at 15:57

## 2019-06-12 RX ADMIN — KETAMINE HYDROCHLORIDE 10 MG: 50 INJECTION, SOLUTION INTRAMUSCULAR; INTRAVENOUS at 16:12

## 2019-06-12 RX ADMIN — FENTANYL CITRATE 12.5 MCG: 50 INJECTION, SOLUTION INTRAMUSCULAR; INTRAVENOUS at 16:19

## 2019-06-12 RX ADMIN — DOPAMINE HYDROCHLORIDE 5 MCG/KG/MIN: 160 INJECTION, SOLUTION INTRAVENOUS at 11:44

## 2019-06-12 RX ADMIN — FENTANYL CITRATE 12.5 MCG: 50 INJECTION, SOLUTION INTRAMUSCULAR; INTRAVENOUS at 15:58

## 2019-06-13 ENCOUNTER — APPOINTMENT (INPATIENT)
Dept: RADIOLOGY | Facility: HOSPITAL | Age: 84
DRG: 242 | End: 2019-06-13
Payer: MEDICARE

## 2019-06-13 LAB
ANION GAP SERPL CALCULATED.3IONS-SCNC: 7 MMOL/L (ref 4–13)
BUN SERPL-MCNC: 23 MG/DL (ref 5–25)
CALCIUM SERPL-MCNC: 8.8 MG/DL (ref 8.3–10.1)
CHLORIDE SERPL-SCNC: 109 MMOL/L (ref 100–108)
CO2 SERPL-SCNC: 24 MMOL/L (ref 21–32)
CREAT SERPL-MCNC: 1.02 MG/DL (ref 0.6–1.3)
ERYTHROCYTE [DISTWIDTH] IN BLOOD BY AUTOMATED COUNT: 13.1 % (ref 11.6–15.1)
GFR SERPL CREATININE-BSD FRML MDRD: 47 ML/MIN/1.73SQ M
GLUCOSE SERPL-MCNC: 204 MG/DL (ref 65–140)
HCT VFR BLD AUTO: 32.3 % (ref 34.8–46.1)
HGB BLD-MCNC: 10.6 G/DL (ref 11.5–15.4)
MCH RBC QN AUTO: 32.5 PG (ref 26.8–34.3)
MCHC RBC AUTO-ENTMCNC: 32.8 G/DL (ref 31.4–37.4)
MCV RBC AUTO: 99 FL (ref 82–98)
PLATELET # BLD AUTO: 164 THOUSANDS/UL (ref 149–390)
PMV BLD AUTO: 11.5 FL (ref 8.9–12.7)
POTASSIUM SERPL-SCNC: 4.7 MMOL/L (ref 3.5–5.3)
RBC # BLD AUTO: 3.26 MILLION/UL (ref 3.81–5.12)
SODIUM SERPL-SCNC: 140 MMOL/L (ref 136–145)
TROPONIN I SERPL-MCNC: 1.51 NG/ML
TROPONIN I SERPL-MCNC: 2.08 NG/ML
TROPONIN I SERPL-MCNC: 2.41 NG/ML
TROPONIN I SERPL-MCNC: 2.5 NG/ML
WBC # BLD AUTO: 8.28 THOUSAND/UL (ref 4.31–10.16)

## 2019-06-13 PROCEDURE — 84484 ASSAY OF TROPONIN QUANT: CPT | Performed by: PHYSICIAN ASSISTANT

## 2019-06-13 PROCEDURE — 99024 POSTOP FOLLOW-UP VISIT: CPT | Performed by: INTERNAL MEDICINE

## 2019-06-13 PROCEDURE — G8987 SELF CARE CURRENT STATUS: HCPCS

## 2019-06-13 PROCEDURE — 99232 SBSQ HOSP IP/OBS MODERATE 35: CPT | Performed by: PHYSICIAN ASSISTANT

## 2019-06-13 PROCEDURE — 85027 COMPLETE CBC AUTOMATED: CPT | Performed by: HOSPITALIST

## 2019-06-13 PROCEDURE — 97167 OT EVAL HIGH COMPLEX 60 MIN: CPT

## 2019-06-13 PROCEDURE — 93005 ELECTROCARDIOGRAM TRACING: CPT

## 2019-06-13 PROCEDURE — G8979 MOBILITY GOAL STATUS: HCPCS

## 2019-06-13 PROCEDURE — G8988 SELF CARE GOAL STATUS: HCPCS

## 2019-06-13 PROCEDURE — 80048 BASIC METABOLIC PNL TOTAL CA: CPT | Performed by: HOSPITALIST

## 2019-06-13 PROCEDURE — 97163 PT EVAL HIGH COMPLEX 45 MIN: CPT

## 2019-06-13 PROCEDURE — G8978 MOBILITY CURRENT STATUS: HCPCS

## 2019-06-13 PROCEDURE — 71046 X-RAY EXAM CHEST 2 VIEWS: CPT

## 2019-06-13 RX ORDER — METOPROLOL SUCCINATE 25 MG/1
25 TABLET, EXTENDED RELEASE ORAL DAILY
Status: DISCONTINUED | OUTPATIENT
Start: 2019-06-13 | End: 2019-06-14 | Stop reason: HOSPADM

## 2019-06-13 RX ORDER — SIMETHICONE 80 MG
80 TABLET,CHEWABLE ORAL DAILY PRN
Status: DISCONTINUED | OUTPATIENT
Start: 2019-06-13 | End: 2019-06-14 | Stop reason: HOSPADM

## 2019-06-13 RX ORDER — LANOLIN ALCOHOL/MO/W.PET/CERES
6 CREAM (GRAM) TOPICAL
Status: DISCONTINUED | OUTPATIENT
Start: 2019-06-13 | End: 2019-06-14 | Stop reason: HOSPADM

## 2019-06-13 RX ADMIN — HEPARIN SODIUM 5000 UNITS: 5000 INJECTION INTRAVENOUS; SUBCUTANEOUS at 22:23

## 2019-06-13 RX ADMIN — ASPIRIN 81 MG 81 MG: 81 TABLET ORAL at 08:51

## 2019-06-13 RX ADMIN — AMITRIPTYLINE HYDROCHLORIDE: 10 TABLET, FILM COATED ORAL at 22:22

## 2019-06-13 RX ADMIN — GUAIFENESIN 600 MG: 600 TABLET, EXTENDED RELEASE ORAL at 22:23

## 2019-06-13 RX ADMIN — PRAVASTATIN SODIUM 40 MG: 40 TABLET ORAL at 15:39

## 2019-06-13 RX ADMIN — CHLORHEXIDINE GLUCONATE 0.12% ORAL RINSE 15 ML: 1.2 LIQUID ORAL at 22:23

## 2019-06-13 RX ADMIN — CHLORHEXIDINE GLUCONATE 0.12% ORAL RINSE 15 ML: 1.2 LIQUID ORAL at 08:55

## 2019-06-13 RX ADMIN — AMITRIPTYLINE HYDROCHLORIDE: 10 TABLET, FILM COATED ORAL at 12:22

## 2019-06-13 RX ADMIN — GUAIFENESIN 600 MG: 600 TABLET, EXTENDED RELEASE ORAL at 08:52

## 2019-06-13 RX ADMIN — METOPROLOL SUCCINATE 25 MG: 25 TABLET, EXTENDED RELEASE ORAL at 14:00

## 2019-06-13 RX ADMIN — HEPARIN SODIUM 5000 UNITS: 5000 INJECTION INTRAVENOUS; SUBCUTANEOUS at 13:26

## 2019-06-13 RX ADMIN — MELATONIN 6 MG: at 22:23

## 2019-06-13 RX ADMIN — HEPARIN SODIUM 5000 UNITS: 5000 INJECTION INTRAVENOUS; SUBCUTANEOUS at 06:13

## 2019-06-14 VITALS
DIASTOLIC BLOOD PRESSURE: 62 MMHG | HEART RATE: 82 BPM | TEMPERATURE: 98.2 F | BODY MASS INDEX: 29.97 KG/M2 | OXYGEN SATURATION: 93 % | RESPIRATION RATE: 18 BRPM | WEIGHT: 158.73 LBS | HEIGHT: 61 IN | SYSTOLIC BLOOD PRESSURE: 146 MMHG

## 2019-06-14 PROBLEM — N17.9 AKI (ACUTE KIDNEY INJURY) (HCC): Status: RESOLVED | Noted: 2019-06-11 | Resolved: 2019-06-14

## 2019-06-14 PROBLEM — R05.9 COUGH: Status: ACTIVE | Noted: 2019-06-14

## 2019-06-14 LAB
ANION GAP SERPL CALCULATED.3IONS-SCNC: 6 MMOL/L (ref 4–13)
ATRIAL RATE: 66 BPM
ATRIAL RATE: 99 BPM
ATRIAL RATE: 99 BPM
BUN SERPL-MCNC: 21 MG/DL (ref 5–25)
CALCIUM SERPL-MCNC: 9.1 MG/DL (ref 8.3–10.1)
CHLORIDE SERPL-SCNC: 107 MMOL/L (ref 100–108)
CO2 SERPL-SCNC: 28 MMOL/L (ref 21–32)
CREAT SERPL-MCNC: 0.82 MG/DL (ref 0.6–1.3)
GFR SERPL CREATININE-BSD FRML MDRD: 61 ML/MIN/1.73SQ M
GLUCOSE SERPL-MCNC: 130 MG/DL (ref 65–140)
MAGNESIUM SERPL-MCNC: 1.8 MG/DL (ref 1.6–2.6)
P AXIS: 36 DEGREES
P AXIS: 41 DEGREES
P AXIS: 54 DEGREES
POTASSIUM SERPL-SCNC: 4.3 MMOL/L (ref 3.5–5.3)
PR INTERVAL: 164 MS
PR INTERVAL: 184 MS
PR INTERVAL: 188 MS
QRS AXIS: -38 DEGREES
QRS AXIS: -9 DEGREES
QRS AXIS: 18 DEGREES
QRSD INTERVAL: 122 MS
QRSD INTERVAL: 129 MS
QRSD INTERVAL: 140 MS
QT INTERVAL: 379 MS
QT INTERVAL: 382 MS
QT INTERVAL: 436 MS
QTC INTERVAL: 457 MS
QTC INTERVAL: 487 MS
QTC INTERVAL: 490 MS
SODIUM SERPL-SCNC: 141 MMOL/L (ref 136–145)
T WAVE AXIS: 17 DEGREES
T WAVE AXIS: 170 DEGREES
T WAVE AXIS: 84 DEGREES
VENTRICULAR RATE: 66 BPM
VENTRICULAR RATE: 99 BPM
VENTRICULAR RATE: 99 BPM

## 2019-06-14 PROCEDURE — 99024 POSTOP FOLLOW-UP VISIT: CPT | Performed by: INTERNAL MEDICINE

## 2019-06-14 PROCEDURE — 93010 ELECTROCARDIOGRAM REPORT: CPT | Performed by: INTERNAL MEDICINE

## 2019-06-14 PROCEDURE — 83735 ASSAY OF MAGNESIUM: CPT | Performed by: PHYSICIAN ASSISTANT

## 2019-06-14 PROCEDURE — 97110 THERAPEUTIC EXERCISES: CPT

## 2019-06-14 PROCEDURE — 97535 SELF CARE MNGMENT TRAINING: CPT

## 2019-06-14 PROCEDURE — 99239 HOSP IP/OBS DSCHRG MGMT >30: CPT | Performed by: PHYSICIAN ASSISTANT

## 2019-06-14 PROCEDURE — 80048 BASIC METABOLIC PNL TOTAL CA: CPT | Performed by: PHYSICIAN ASSISTANT

## 2019-06-14 PROCEDURE — 97530 THERAPEUTIC ACTIVITIES: CPT

## 2019-06-14 PROCEDURE — 97116 GAIT TRAINING THERAPY: CPT

## 2019-06-14 PROCEDURE — 93005 ELECTROCARDIOGRAM TRACING: CPT

## 2019-06-14 RX ORDER — CHLORHEXIDINE GLUCONATE 0.12 MG/ML
15 RINSE ORAL 2 TIMES DAILY
Qty: 120 ML | Refills: 0 | Status: SHIPPED | OUTPATIENT
Start: 2019-06-14

## 2019-06-14 RX ORDER — METOPROLOL SUCCINATE 25 MG/1
25 TABLET, EXTENDED RELEASE ORAL DAILY
Qty: 30 TABLET | Refills: 0 | Status: SHIPPED | OUTPATIENT
Start: 2019-06-15

## 2019-06-14 RX ORDER — GUAIFENESIN 600 MG
600 TABLET, EXTENDED RELEASE 12 HR ORAL EVERY 12 HOURS SCHEDULED
Qty: 20 TABLET | Refills: 0 | Status: SHIPPED | OUTPATIENT
Start: 2019-06-14

## 2019-06-14 RX ADMIN — GUAIFENESIN 600 MG: 600 TABLET, EXTENDED RELEASE ORAL at 09:40

## 2019-06-14 RX ADMIN — METOPROLOL SUCCINATE 25 MG: 25 TABLET, EXTENDED RELEASE ORAL at 09:40

## 2019-06-14 RX ADMIN — ASPIRIN 81 MG 81 MG: 81 TABLET ORAL at 09:40

## 2019-06-14 RX ADMIN — HEPARIN SODIUM 5000 UNITS: 5000 INJECTION INTRAVENOUS; SUBCUTANEOUS at 06:15

## 2019-06-14 RX ADMIN — HEPARIN SODIUM 5000 UNITS: 5000 INJECTION INTRAVENOUS; SUBCUTANEOUS at 13:39

## 2019-06-14 RX ADMIN — AMITRIPTYLINE HYDROCHLORIDE: 10 TABLET, FILM COATED ORAL at 09:40

## 2019-06-14 RX ADMIN — CHLORHEXIDINE GLUCONATE 0.12% ORAL RINSE 15 ML: 1.2 LIQUID ORAL at 09:41

## 2019-06-27 ENCOUNTER — IN-CLINIC DEVICE VISIT (OUTPATIENT)
Dept: CARDIOLOGY CLINIC | Facility: CLINIC | Age: 84
End: 2019-06-27

## 2019-06-27 DIAGNOSIS — Z95.0 CARDIAC PACEMAKER IN SITU: Primary | ICD-10-CM

## 2019-06-27 PROCEDURE — 99024 POSTOP FOLLOW-UP VISIT: CPT | Performed by: INTERNAL MEDICINE
